# Patient Record
Sex: MALE | Race: WHITE | Employment: OTHER | URBAN - NONMETROPOLITAN AREA
[De-identification: names, ages, dates, MRNs, and addresses within clinical notes are randomized per-mention and may not be internally consistent; named-entity substitution may affect disease eponyms.]

---

## 2017-01-31 RX ORDER — FLUDROCORTISONE ACETATE 0.1 MG/1
0.1 TABLET ORAL DAILY
Qty: 30 TABLET | Refills: 6 | Status: SHIPPED | OUTPATIENT
Start: 2017-01-31 | End: 2017-09-13 | Stop reason: SDUPTHER

## 2017-04-20 ENCOUNTER — OFFICE VISIT (OUTPATIENT)
Dept: CARDIOLOGY CLINIC | Age: 72
End: 2017-04-20
Payer: MEDICARE

## 2017-04-20 VITALS
HEIGHT: 72 IN | BODY MASS INDEX: 24.52 KG/M2 | HEART RATE: 95 BPM | WEIGHT: 181 LBS | DIASTOLIC BLOOD PRESSURE: 100 MMHG | SYSTOLIC BLOOD PRESSURE: 154 MMHG | RESPIRATION RATE: 16 BRPM

## 2017-04-20 DIAGNOSIS — R55 VASODEPRESSOR SYNCOPE: Primary | ICD-10-CM

## 2017-04-20 PROCEDURE — G8420 CALC BMI NORM PARAMETERS: HCPCS | Performed by: INTERNAL MEDICINE

## 2017-04-20 PROCEDURE — 3017F COLORECTAL CA SCREEN DOC REV: CPT | Performed by: INTERNAL MEDICINE

## 2017-04-20 PROCEDURE — 4040F PNEUMOC VAC/ADMIN/RCVD: CPT | Performed by: INTERNAL MEDICINE

## 2017-04-20 PROCEDURE — 1123F ACP DISCUSS/DSCN MKR DOCD: CPT | Performed by: INTERNAL MEDICINE

## 2017-04-20 PROCEDURE — 1036F TOBACCO NON-USER: CPT | Performed by: INTERNAL MEDICINE

## 2017-04-20 PROCEDURE — G8427 DOCREV CUR MEDS BY ELIG CLIN: HCPCS | Performed by: INTERNAL MEDICINE

## 2017-04-20 PROCEDURE — 99213 OFFICE O/P EST LOW 20 MIN: CPT | Performed by: INTERNAL MEDICINE

## 2017-06-21 PROBLEM — I10 ESSENTIAL HYPERTENSION: Status: ACTIVE | Noted: 2017-06-21

## 2017-09-14 RX ORDER — FLUDROCORTISONE ACETATE 0.1 MG/1
0.1 TABLET ORAL DAILY
Qty: 90 TABLET | Refills: 3 | Status: SHIPPED | OUTPATIENT
Start: 2017-09-14 | End: 2018-08-14 | Stop reason: SDUPTHER

## 2017-10-23 ENCOUNTER — OFFICE VISIT (OUTPATIENT)
Dept: CARDIOLOGY CLINIC | Age: 72
End: 2017-10-23
Payer: MEDICARE

## 2017-10-23 VITALS
DIASTOLIC BLOOD PRESSURE: 96 MMHG | HEART RATE: 92 BPM | SYSTOLIC BLOOD PRESSURE: 156 MMHG | HEIGHT: 72 IN | BODY MASS INDEX: 24.24 KG/M2 | WEIGHT: 179 LBS

## 2017-10-23 DIAGNOSIS — I10 HYPERTENSION, UNSPECIFIED TYPE: Primary | ICD-10-CM

## 2017-10-23 PROCEDURE — G8420 CALC BMI NORM PARAMETERS: HCPCS | Performed by: INTERNAL MEDICINE

## 2017-10-23 PROCEDURE — G8484 FLU IMMUNIZE NO ADMIN: HCPCS | Performed by: INTERNAL MEDICINE

## 2017-10-23 PROCEDURE — 1123F ACP DISCUSS/DSCN MKR DOCD: CPT | Performed by: INTERNAL MEDICINE

## 2017-10-23 PROCEDURE — 3017F COLORECTAL CA SCREEN DOC REV: CPT | Performed by: INTERNAL MEDICINE

## 2017-10-23 PROCEDURE — G8427 DOCREV CUR MEDS BY ELIG CLIN: HCPCS | Performed by: INTERNAL MEDICINE

## 2017-10-23 PROCEDURE — 4040F PNEUMOC VAC/ADMIN/RCVD: CPT | Performed by: INTERNAL MEDICINE

## 2017-10-23 PROCEDURE — 1036F TOBACCO NON-USER: CPT | Performed by: INTERNAL MEDICINE

## 2017-10-23 PROCEDURE — 99213 OFFICE O/P EST LOW 20 MIN: CPT | Performed by: INTERNAL MEDICINE

## 2017-10-23 RX ORDER — VALSARTAN 40 MG/1
40 TABLET ORAL DAILY
Qty: 30 TABLET | Refills: 3 | Status: SHIPPED | OUTPATIENT
Start: 2017-10-23 | End: 2018-01-09 | Stop reason: SDUPTHER

## 2017-10-23 NOTE — PROGRESS NOTES
Today's Date: 10/23/2017  Patient Name: Amaris Diamond  Patient's age: 67 y.o., 1945          The patient is a 67 y.o.  male is in the office for f/u, had few dizzy spells but no syncope. Denies angina. Past Medical History:   has a past medical history of History of PSVT (paroxysmal supraventricular tachycardia); Low back pain; UTI (urinary tract infection); Uvulitis; Vasodepressor syncope; and Wound, open, face. Past Surgical History:   has a past surgical history that includes Colonoscopy. Home Medications:    Prior to Admission medications    Medication Sig Start Date End Date Taking? Authorizing Provider   HYDROcodone-acetaminophen (NORCO)  MG per tablet Take 1 tablet by mouth every 6 hours as needed for Pain . 10/18/17   Zachary Escobar MD   fludrocortisone (FLORINEF) 0.1 MG tablet Take 1 tablet by mouth daily 9/14/17   Charly Figueroa MD   meloxicam (MOBIC) 15 MG tablet TAKE ONE TABLET BY MOUTH ONCE DAILY 9/1/17   Zachary Escobar MD   allopurinol (ZYLOPRIM) 300 MG tablet TAKE ONE TABLET BY MOUTH ONCE DAILY 6/19/17   Zachary Escobar MD   sildenafil (VIAGRA) 100 MG tablet Take 1 tablet by mouth as needed for Erectile Dysfunction 1/17/17   Zachary Escobar MD   pseudoephedrine (SUDAFED 12 HOUR) 120 MG extended release tablet Take 1 tablet by mouth every 12 hours 1/17/17   Zachary Escobar MD   Docusate Calcium (STOOL SOFTENER PO) Take 1 capsule by mouth daily     Historical Provider, MD   aspirin 81 MG tablet Take 81 mg by mouth daily    Historical Provider, MD       Allergies:  Review of patient's allergies indicates no known allergies. Social History:   reports that he has quit smoking. He has never used smokeless tobacco. He reports that he does not drink alcohol or use drugs. REVIEW OF SYSTEMS:  CONSTITUTIONAL:NEGATIVE  HEENT:NEG  Cardiovascular: No chest pain, No dyspnea on exertion, No palpitations.  Lower extremity edema: No  RESPIRATORY: neg  GASTROINTESTINAL:  negative  GENITOURINARY:  negative  INTEGUMENT:  negative  MUSCULOSKELETAL:  positive for  pain  NEUROLOGICAL:  dizziness    PHYSICAL EXAM:      BP (!) 156/96   Pulse 92   Ht 5' 11.75\" (1.822 m)   Wt 179 lb (81.2 kg)   BMI 24.45 kg/m²    HEENT: PERRL, no cervical lymphadenopathy. No masses palpable. Cardiovascular:  · The apical impulse is not displaced  · Heart  Sounds: RRR, NO S3/RUB/DM  · Jugular venous pulsation Normal  · The carotid upstroke is NL  · Peripheral pulses are symmetrical and full  Respiratory: Good respiratory effort. On auscultation: clear to auscultation bilaterally  Abdomen:  · No masses or tenderness  · Bowel sounds present  Extremities:  ·  No Cyanosis or Clubbing  ·  Lower extremity edema: No  Skin: Warm and dry    Cardiac data:    EKG: NSR, SEPTAL Q WAVES    Labs:     CBC: No results for input(s): WBC, HGB, HCT, PLT in the last 72 hours. BMP: No results for input(s): NA, K, CO2, BUN, CREATININE, LABGLOM, GLUCOSE in the last 72 hours. PT/INR: No results for input(s): PROTIME, INR in the last 72 hours. FASTING LIPID PANEL:  Lab Results   Component Value Date    HDL 44 08/05/2016    LDLCALC 46 08/05/2016    TRIG 43 08/05/2016     LIVER PROFILE:No results for input(s): AST, ALT, LABALBU in the last 72 hours.     IMPRESSION:    Vasodepressor syncope (+ tilt table test 7/2014)  Negative stress test  PRESERVED LV SYSTOLIC FUCNTION   HTN  PSVT, REMOTE HX  Patient Active Problem List   Diagnosis    Allergic rhinitis    Arterial insufficiency (HCC)    Autonomic nervous system disorder    Bursitis of right hip    Diverticulosis    EKG, abnormal    Encounter for screening for malignant neoplasm of prostate    Fatty liver    Gout    Lower back pain    Male erectile disorder    Muscle weakness    Polyarthritis    Raynaud's disease    Anxiety    Vasodepressor syncope    History of PSVT (paroxysmal supraventricular tachycardia)    Gastroesophageal reflux disease without esophagitis    Essential hypertension       RECOMMENDATIONS:  START DIOVAN 40 MG   REGULAR EXERCISE  CALORIE RESTRICTION  WEIGHT LOSS  CONTINUE CURRENT TREATMENT    F/U 3 MONTHS          Lindy Leon [address] Cardiology Consult           [phone]

## 2018-01-22 ENCOUNTER — OFFICE VISIT (OUTPATIENT)
Dept: CARDIOLOGY CLINIC | Age: 73
End: 2018-01-22
Payer: MEDICARE

## 2018-01-22 VITALS
BODY MASS INDEX: 25.6 KG/M2 | HEIGHT: 72 IN | WEIGHT: 189 LBS | HEART RATE: 80 BPM | DIASTOLIC BLOOD PRESSURE: 96 MMHG | SYSTOLIC BLOOD PRESSURE: 158 MMHG

## 2018-01-22 DIAGNOSIS — I10 HYPERTENSION, UNSPECIFIED TYPE: Primary | ICD-10-CM

## 2018-01-22 PROCEDURE — 99213 OFFICE O/P EST LOW 20 MIN: CPT | Performed by: INTERNAL MEDICINE

## 2018-01-22 NOTE — PROGRESS NOTES
Today's Date: 1/22/2018  Patient Name: Rosie Tsai  Patient's age: 67 y.o., 1945          The patient is a 67 y.o.  male is in the office for f/u, his BP was elevated requiring medications dose increase. Past Medical History:   has a past medical history of History of PSVT (paroxysmal supraventricular tachycardia); Low back pain; UTI (urinary tract infection); Uvulitis; Vasodepressor syncope; and Wound, open, face. Past Surgical History:   has a past surgical history that includes Colonoscopy. Home Medications:    Prior to Admission medications    Medication Sig Start Date End Date Taking? Authorizing Provider   HYDROcodone-acetaminophen (NORCO)  MG per tablet Take 1 tablet by mouth every 6 hours as needed for Pain for up to 30 days. 1/17/18 2/16/18 Yes Franc Steele MD   valsartan (DIOVAN) 80 MG tablet Take 1 tablet by mouth daily 1/9/18  Yes Franc Steele MD   allopurinol (ZYLOPRIM) 300 MG tablet TAKE ONE TABLET BY MOUTH ONCE DAILY 12/18/17  Yes Franc Steele MD   fludrocortisone (FLORINEF) 0.1 MG tablet Take 1 tablet by mouth daily 9/14/17  Yes Jia Pérez MD   meloxicam (MOBIC) 15 MG tablet TAKE ONE TABLET BY MOUTH ONCE DAILY 9/1/17  Yes Franc Steele MD   Docusate Calcium (STOOL SOFTENER PO) Take 1 capsule by mouth daily    Yes Historical Provider, MD   aspirin 81 MG tablet Take 81 mg by mouth daily   Yes Historical Provider, MD   hydrALAZINE (APRESOLINE) 10 MG tablet Take 1 tablet by mouth 4 times daily as needed (systolic greater than 294) 1/9/18 1/9/19  Franc Steele MD   sildenafil (VIAGRA) 100 MG tablet Take 1 tablet by mouth as needed for Erectile Dysfunction 1/17/17   Franc Steele MD       Allergies:  Review of patient's allergies indicates no known allergies. Social History:   reports that he quit smoking about 42 years ago.  He has never used smokeless tobacco. He reports that he does not drink alcohol or use drugs. REVIEW OF SYSTEMS:  CONSTITUTIONAL:NEGATIVE  HEENT:NEG  Cardiovascular: No chest pain, No dyspnea on exertion, No palpitations. Lower extremity edema: No  RESPIRATORY: neg  GASTROINTESTINAL:  negative  GENITOURINARY:  negative  INTEGUMENT:  negative  MUSCULOSKELETAL:  positive for  pain  NEUROLOGICAL:  negative    PHYSICAL EXAM:      BP (!) 158/96   Pulse 80   Ht 6' (1.829 m)   Wt 189 lb (85.7 kg)   BMI 25.63 kg/m²    HEENT: PERRL, no cervical lymphadenopathy. No masses palpable. Cardiovascular:  · The apical impulse is not displaced  · Heart  Sounds:RRR, S4  · Jugular venous pulsation Normal  · The carotid upstroke is NL  · Peripheral pulses are symmetrical and full  Respiratory: Good respiratory effort. On auscultation: clear to auscultation bilaterally  Abdomen:  · No masses or tenderness  · Bowel sounds present  Extremities:  ·  No Cyanosis or Clubbing  ·  Lower extremity edema: No  Skin: Warm and dry    Cardiac data:      Labs:     CBC: No results for input(s): WBC, HGB, HCT, PLT in the last 72 hours. BMP: No results for input(s): NA, K, CO2, BUN, CREATININE, LABGLOM, GLUCOSE in the last 72 hours. PT/INR: No results for input(s): PROTIME, INR in the last 72 hours. FASTING LIPID PANEL:  Lab Results   Component Value Date    HDL 41 10/25/2017    LDLCALC 60 10/25/2017    TRIG 58 10/25/2017     LIVER PROFILE:No results for input(s): AST, ALT, LABALBU in the last 72 hours.     IMPRESSION:    Vasodepressor syncope (+ tilt table test 7/2014)  Negative stress test  PRESERVED LV SYSTOLIC FUCNTION   HTN  PSVT, REMOTE HX  WEIGHT GAIN  Patient Active Problem List   Diagnosis    Allergic rhinitis    Arterial insufficiency (HCC)    Autonomic nervous system disorder    Bursitis of right hip    Diverticulosis    EKG, abnormal    Encounter for screening for malignant neoplasm of prostate    Fatty liver    Gout    Lower back pain    Male erectile disorder    Muscle weakness   

## 2018-05-01 PROBLEM — M16.11 PRIMARY OSTEOARTHRITIS OF RIGHT HIP: Status: ACTIVE | Noted: 2018-05-01

## 2018-07-16 ENCOUNTER — OFFICE VISIT (OUTPATIENT)
Dept: CARDIOLOGY CLINIC | Age: 73
End: 2018-07-16
Payer: MEDICARE

## 2018-07-16 VITALS
BODY MASS INDEX: 25.57 KG/M2 | WEIGHT: 188.8 LBS | DIASTOLIC BLOOD PRESSURE: 84 MMHG | RESPIRATION RATE: 18 BRPM | HEIGHT: 72 IN | SYSTOLIC BLOOD PRESSURE: 144 MMHG

## 2018-07-16 DIAGNOSIS — R55 VASODEPRESSOR SYNCOPE: ICD-10-CM

## 2018-07-16 DIAGNOSIS — I77.1 ARTERIAL INSUFFICIENCY (HCC): Primary | ICD-10-CM

## 2018-07-16 DIAGNOSIS — I10 ESSENTIAL HYPERTENSION: ICD-10-CM

## 2018-07-16 DIAGNOSIS — I73.00 RAYNAUD'S DISEASE WITHOUT GANGRENE: ICD-10-CM

## 2018-07-16 DIAGNOSIS — G90.9 AUTONOMIC NERVOUS SYSTEM DISORDER: ICD-10-CM

## 2018-07-16 PROCEDURE — 99213 OFFICE O/P EST LOW 20 MIN: CPT | Performed by: INTERNAL MEDICINE

## 2018-07-16 NOTE — PROGRESS NOTES
Today's Date: 7/16/2018  Patient Name: Valerie Wright  Patient's age: 75 y.o., 1945          The patient is a 75 y.o.  male is in the office for f/u  Doing well. No chest pain, no dyspnea. No pre-syncope or syncope. Very good functional capacity. Past Medical History:   has a past medical history of History of PSVT (paroxysmal supraventricular tachycardia); Low back pain; UTI (urinary tract infection); Uvulitis; Vasodepressor syncope; and Wound, open, face. Past Surgical History:   has a past surgical history that includes Colonoscopy. Home Medications:    Prior to Admission medications    Medication Sig Start Date End Date Taking? Authorizing Provider   allopurinol (ZYLOPRIM) 300 MG tablet TAKE ONE TABLET BY MOUTH ONCE DAILY 6/6/18  Yes Rakan Medeiros MD   meloxicam [facility] DELA CRU[facility]) 15 MG tablet TAKE ONE TABLET BY MOUTH ONCE DAILY 6/4/18  Yes Rakan Medeiros MD   valsartan (DIOVAN) 160 MG tablet Take 1 tablet by mouth daily 5/1/18  Yes Rakan Medeiros MD   metoprolol tartrate (LOPRESSOR) 25 MG tablet Take 1 tablet by mouth 2 times daily 1/23/18  Yes Rakan Medeiros MD   hydrALAZINE (APRESOLINE) 10 MG tablet Take 1 tablet by mouth 4 times daily as needed (systolic greater than 290) 1/9/18 1/9/19 Yes Rakan Medeiros MD   fludrocortisone (FLORINEF) 0.1 MG tablet Take 1 tablet by mouth daily 9/14/17  Yes Jorge Turpin MD   Docusate Calcium (STOOL SOFTENER PO) Take 1 capsule by mouth daily    Yes Historical Provider, MD   aspirin 81 MG tablet Take 81 mg by mouth daily   Yes Historical Provider, MD   azelastine (ASTELIN) 0.1 % nasal spray 1 spray by Nasal route 2 times daily Use in each nostril as directed 5/1/18   Rakan Medeiros MD   sildenafil (VIAGRA) 100 MG tablet Take 1 tablet by mouth as needed for Erectile Dysfunction 1/17/17   Rakan Medeiros MD       Allergies:  Patient has no known allergies.     Social History:   reports that he quit smoking about 42 years ago. He has never used smokeless tobacco. He reports that he does not drink alcohol or use drugs. REVIEW OF SYSTEMS:  CONSTITUTIONAL:NEGATIVE  HEENT:NEG  Cardiovascular: No chest pain, No dyspnea on exertion, No palpitations. Lower extremity edema: No  RESPIRATORY: neg  GASTROINTESTINAL:  negative  GENITOURINARY:  negative  INTEGUMENT:  negative  MUSCULOSKELETAL:  positive for  pain  NEUROLOGICAL:  negative    PHYSICAL EXAM:      BP (!) 144/84 (Site: Right Arm, Position: Sitting, Cuff Size: Large Adult)   Resp 18   Ht 6' (1.829 m)   Wt 188 lb 12.8 oz (85.6 kg)   BMI 25.61 kg/m²    HEENT: PERRL, no cervical lymphadenopathy. No masses palpable. Cardiovascular:  · The apical impulse is not displaced  · Heart  Sounds:RRR, S4  · Jugular venous pulsation Normal  · The carotid upstroke is NL  · Peripheral pulses are symmetrical and full  Respiratory: Good respiratory effort. On auscultation: clear to auscultation bilaterally  Abdomen:  · No masses or tenderness  · Bowel sounds present  Extremities:  ·  No Cyanosis or Clubbing  ·  Lower extremity edema: No  Skin: Warm and dry    Cardiac data:      Labs:     CBC: No results for input(s): WBC, HGB, HCT, PLT in the last 72 hours. BMP: No results for input(s): NA, K, CO2, BUN, CREATININE, LABGLOM, GLUCOSE in the last 72 hours. PT/INR: No results for input(s): PROTIME, INR in the last 72 hours. FASTING LIPID PANEL:  Lab Results   Component Value Date    HDL 41 10/25/2017    LDLCALC 60 10/25/2017    TRIG 58 10/25/2017     LIVER PROFILE:No results for input(s): AST, ALT, LABALBU in the last 72 hours.       Patient Active Problem List   Diagnosis    Allergic rhinitis    Arterial insufficiency (HCC)    Autonomic nervous system disorder    Bursitis of right hip    Diverticulosis    EKG, abnormal    Encounter for screening for malignant neoplasm of prostate    Fatty liver    Gout    Lower back pain    Male erectile disorder    Muscle

## 2018-10-05 RX ORDER — FLUDROCORTISONE ACETATE 0.1 MG/1
0.1 TABLET ORAL DAILY
Qty: 90 TABLET | Refills: 3 | Status: SHIPPED | OUTPATIENT
Start: 2018-10-05 | End: 2019-04-04

## 2018-12-13 ENCOUNTER — OFFICE VISIT (OUTPATIENT)
Dept: PRIMARY CARE CLINIC | Age: 73
End: 2018-12-13
Payer: MEDICARE

## 2018-12-13 VITALS
DIASTOLIC BLOOD PRESSURE: 76 MMHG | HEART RATE: 77 BPM | HEIGHT: 72 IN | SYSTOLIC BLOOD PRESSURE: 132 MMHG | WEIGHT: 189.6 LBS | BODY MASS INDEX: 25.68 KG/M2 | OXYGEN SATURATION: 97 %

## 2018-12-13 DIAGNOSIS — G89.29 CHRONIC LOW BACK PAIN WITHOUT SCIATICA, UNSPECIFIED BACK PAIN LATERALITY: ICD-10-CM

## 2018-12-13 DIAGNOSIS — R55 VASODEPRESSOR SYNCOPE: ICD-10-CM

## 2018-12-13 DIAGNOSIS — M54.50 CHRONIC LOW BACK PAIN WITHOUT SCIATICA, UNSPECIFIED BACK PAIN LATERALITY: ICD-10-CM

## 2018-12-13 DIAGNOSIS — M70.71 BURSITIS OF RIGHT HIP, UNSPECIFIED BURSA: ICD-10-CM

## 2018-12-13 DIAGNOSIS — M16.11 PRIMARY OSTEOARTHRITIS OF RIGHT HIP: Primary | ICD-10-CM

## 2018-12-13 DIAGNOSIS — I10 ESSENTIAL HYPERTENSION: ICD-10-CM

## 2018-12-13 PROCEDURE — 1123F ACP DISCUSS/DSCN MKR DOCD: CPT | Performed by: FAMILY MEDICINE

## 2018-12-13 PROCEDURE — 1101F PT FALLS ASSESS-DOCD LE1/YR: CPT | Performed by: FAMILY MEDICINE

## 2018-12-13 PROCEDURE — 4040F PNEUMOC VAC/ADMIN/RCVD: CPT | Performed by: FAMILY MEDICINE

## 2018-12-13 PROCEDURE — 3017F COLORECTAL CA SCREEN DOC REV: CPT | Performed by: FAMILY MEDICINE

## 2018-12-13 PROCEDURE — G8419 CALC BMI OUT NRM PARAM NOF/U: HCPCS | Performed by: FAMILY MEDICINE

## 2018-12-13 PROCEDURE — 99214 OFFICE O/P EST MOD 30 MIN: CPT | Performed by: FAMILY MEDICINE

## 2018-12-13 PROCEDURE — 1036F TOBACCO NON-USER: CPT | Performed by: FAMILY MEDICINE

## 2018-12-13 PROCEDURE — G8427 DOCREV CUR MEDS BY ELIG CLIN: HCPCS | Performed by: FAMILY MEDICINE

## 2018-12-13 PROCEDURE — G8482 FLU IMMUNIZE ORDER/ADMIN: HCPCS | Performed by: FAMILY MEDICINE

## 2018-12-13 RX ORDER — HYDROCODONE BITARTRATE AND ACETAMINOPHEN 10; 325 MG/1; MG/1
1 TABLET ORAL EVERY 6 HOURS PRN
Qty: 120 TABLET | Refills: 0 | Status: SHIPPED | OUTPATIENT
Start: 2018-12-13 | End: 2019-01-16 | Stop reason: SDUPTHER

## 2018-12-13 RX ORDER — MELOXICAM 15 MG/1
15 TABLET ORAL DAILY
COMMUNITY
End: 2019-04-04

## 2018-12-13 RX ORDER — ALLOPURINOL 300 MG/1
TABLET ORAL
Qty: 90 TABLET | Refills: 1 | Status: SHIPPED | OUTPATIENT
Start: 2018-12-13 | End: 2019-06-13 | Stop reason: SDUPTHER

## 2018-12-13 ASSESSMENT — ENCOUNTER SYMPTOMS
DIARRHEA: 0
NAUSEA: 0
EYE DISCHARGE: 0
EYE REDNESS: 0
RHINORRHEA: 0
WHEEZING: 0
VOMITING: 0
SHORTNESS OF BREATH: 0
COUGH: 0
ABDOMINAL PAIN: 0
SORE THROAT: 0

## 2018-12-13 ASSESSMENT — PATIENT HEALTH QUESTIONNAIRE - PHQ9
SUM OF ALL RESPONSES TO PHQ9 QUESTIONS 1 & 2: 0
2. FEELING DOWN, DEPRESSED OR HOPELESS: 0
SUM OF ALL RESPONSES TO PHQ QUESTIONS 1-9: 0
SUM OF ALL RESPONSES TO PHQ QUESTIONS 1-9: 0
1. LITTLE INTEREST OR PLEASURE IN DOING THINGS: 0

## 2018-12-13 NOTE — PROGRESS NOTES
Genitourinary: Negative for dysuria and frequency. Musculoskeletal: Negative for arthralgias and myalgias. Neurological: Negative for dizziness, light-headedness and headaches. Psychiatric/Behavioral: Negative for sleep disturbance. Objective:     /76   Pulse 77   Ht 6' (1.829 m)   Wt 189 lb 9.6 oz (86 kg)   SpO2 97%   BMI 25.71 kg/m²   Physical Exam   Constitutional: He is oriented to person, place, and time. He appears well-developed and well-nourished. No distress. HENT:   Head: Normocephalic and atraumatic. Mouth/Throat: Oropharynx is clear and moist.   Eyes: Pupils are equal, round, and reactive to light. Conjunctivae are normal. Right eye exhibits no discharge. Left eye exhibits no discharge. No scleral icterus. Neck: No tracheal deviation present. No thyromegaly present. Cardiovascular: Normal rate, regular rhythm and normal heart sounds. No carotid bruits   Pulmonary/Chest: Effort normal and breath sounds normal. No respiratory distress. He has no wheezes. Musculoskeletal: He exhibits no edema. Lymphadenopathy:     He has no cervical adenopathy. Neurological: He is alert and oriented to person, place, and time. Skin: Skin is warm. No rash noted. Psychiatric: He has a normal mood and affect. His behavior is normal. Thought content normal.   Nursing note and vitals reviewed. Controlled Substances Monitoring:     RX Monitoring 12/13/2018   Attestation The Prescription Monitoring Report for this patient was reviewed today. Documentation Possible medication side effects, risk of tolerance/dependence & alternative treatments discussed. ;No signs of potential drug abuse or diversion identified. Acute Pain Prescriptions -   Chronic Pain Treatment objectives documented - patient is progressing appropriately. Medication Contracts Existing medication contract. Assessment:       Diagnosis Orders   1. Primary osteoarthritis of right hip     2.  Chronic low back

## 2019-01-03 ENCOUNTER — OFFICE VISIT (OUTPATIENT)
Dept: CARDIOLOGY CLINIC | Age: 74
End: 2019-01-03
Payer: MEDICARE

## 2019-01-03 VITALS
DIASTOLIC BLOOD PRESSURE: 80 MMHG | BODY MASS INDEX: 25.55 KG/M2 | HEART RATE: 70 BPM | WEIGHT: 188.6 LBS | SYSTOLIC BLOOD PRESSURE: 159 MMHG | HEIGHT: 72 IN

## 2019-01-03 DIAGNOSIS — I10 ESSENTIAL HYPERTENSION: ICD-10-CM

## 2019-01-03 DIAGNOSIS — I77.1 ARTERIAL INSUFFICIENCY (HCC): Primary | ICD-10-CM

## 2019-01-03 DIAGNOSIS — J30.9 ALLERGIC RHINITIS, UNSPECIFIED SEASONALITY, UNSPECIFIED TRIGGER: ICD-10-CM

## 2019-01-03 DIAGNOSIS — R55 VASODEPRESSOR SYNCOPE: ICD-10-CM

## 2019-01-03 DIAGNOSIS — Z86.79 HISTORY OF PSVT (PAROXYSMAL SUPRAVENTRICULAR TACHYCARDIA): ICD-10-CM

## 2019-01-03 DIAGNOSIS — F41.9 ANXIETY: ICD-10-CM

## 2019-01-03 PROCEDURE — 4040F PNEUMOC VAC/ADMIN/RCVD: CPT | Performed by: INTERNAL MEDICINE

## 2019-01-03 PROCEDURE — G8419 CALC BMI OUT NRM PARAM NOF/U: HCPCS | Performed by: INTERNAL MEDICINE

## 2019-01-03 PROCEDURE — 1036F TOBACCO NON-USER: CPT | Performed by: INTERNAL MEDICINE

## 2019-01-03 PROCEDURE — G8427 DOCREV CUR MEDS BY ELIG CLIN: HCPCS | Performed by: INTERNAL MEDICINE

## 2019-01-03 PROCEDURE — 1123F ACP DISCUSS/DSCN MKR DOCD: CPT | Performed by: INTERNAL MEDICINE

## 2019-01-03 PROCEDURE — 99213 OFFICE O/P EST LOW 20 MIN: CPT | Performed by: INTERNAL MEDICINE

## 2019-01-03 PROCEDURE — G8482 FLU IMMUNIZE ORDER/ADMIN: HCPCS | Performed by: INTERNAL MEDICINE

## 2019-01-03 PROCEDURE — 3017F COLORECTAL CA SCREEN DOC REV: CPT | Performed by: INTERNAL MEDICINE

## 2019-01-03 PROCEDURE — 1101F PT FALLS ASSESS-DOCD LE1/YR: CPT | Performed by: INTERNAL MEDICINE

## 2019-01-16 DIAGNOSIS — M54.50 CHRONIC LOW BACK PAIN WITHOUT SCIATICA, UNSPECIFIED BACK PAIN LATERALITY: ICD-10-CM

## 2019-01-16 DIAGNOSIS — M70.71 BURSITIS OF RIGHT HIP, UNSPECIFIED BURSA: ICD-10-CM

## 2019-01-16 DIAGNOSIS — G89.29 CHRONIC LOW BACK PAIN WITHOUT SCIATICA, UNSPECIFIED BACK PAIN LATERALITY: ICD-10-CM

## 2019-01-16 RX ORDER — HYDROCODONE BITARTRATE AND ACETAMINOPHEN 10; 325 MG/1; MG/1
1 TABLET ORAL EVERY 6 HOURS PRN
Qty: 120 TABLET | Refills: 0 | Status: SHIPPED | OUTPATIENT
Start: 2019-01-16 | End: 2019-02-20 | Stop reason: SDUPTHER

## 2019-02-20 DIAGNOSIS — M70.71 BURSITIS OF RIGHT HIP, UNSPECIFIED BURSA: ICD-10-CM

## 2019-02-20 DIAGNOSIS — M54.50 CHRONIC LOW BACK PAIN WITHOUT SCIATICA, UNSPECIFIED BACK PAIN LATERALITY: ICD-10-CM

## 2019-02-20 DIAGNOSIS — G89.29 CHRONIC LOW BACK PAIN WITHOUT SCIATICA, UNSPECIFIED BACK PAIN LATERALITY: ICD-10-CM

## 2019-02-20 RX ORDER — HYDROCODONE BITARTRATE AND ACETAMINOPHEN 10; 325 MG/1; MG/1
1 TABLET ORAL EVERY 6 HOURS PRN
Qty: 120 TABLET | Refills: 0 | Status: SHIPPED | OUTPATIENT
Start: 2019-02-20 | End: 2019-03-14 | Stop reason: SDUPTHER

## 2019-03-14 DIAGNOSIS — G89.29 CHRONIC LOW BACK PAIN WITHOUT SCIATICA, UNSPECIFIED BACK PAIN LATERALITY: ICD-10-CM

## 2019-03-14 DIAGNOSIS — M70.71 BURSITIS OF RIGHT HIP, UNSPECIFIED BURSA: ICD-10-CM

## 2019-03-14 DIAGNOSIS — M54.50 CHRONIC LOW BACK PAIN WITHOUT SCIATICA, UNSPECIFIED BACK PAIN LATERALITY: ICD-10-CM

## 2019-03-14 RX ORDER — HYDROCODONE BITARTRATE AND ACETAMINOPHEN 10; 325 MG/1; MG/1
1 TABLET ORAL EVERY 6 HOURS PRN
Qty: 120 TABLET | Refills: 0 | Status: SHIPPED | OUTPATIENT
Start: 2019-03-21 | End: 2019-04-04 | Stop reason: SDUPTHER

## 2019-03-29 DIAGNOSIS — G89.29 CHRONIC LOW BACK PAIN WITHOUT SCIATICA, UNSPECIFIED BACK PAIN LATERALITY: ICD-10-CM

## 2019-03-29 DIAGNOSIS — M70.71 BURSITIS OF RIGHT HIP, UNSPECIFIED BURSA: ICD-10-CM

## 2019-03-29 DIAGNOSIS — M54.50 CHRONIC LOW BACK PAIN WITHOUT SCIATICA, UNSPECIFIED BACK PAIN LATERALITY: ICD-10-CM

## 2019-03-29 RX ORDER — HYDROCODONE BITARTRATE AND ACETAMINOPHEN 10; 325 MG/1; MG/1
1 TABLET ORAL EVERY 6 HOURS PRN
Qty: 120 TABLET | Refills: 0 | Status: CANCELLED | OUTPATIENT
Start: 2019-03-29 | End: 2019-04-28

## 2019-04-04 ENCOUNTER — HOSPITAL ENCOUNTER (OUTPATIENT)
Age: 74
Setting detail: SPECIMEN
Discharge: HOME | End: 2019-04-04
Payer: MEDICARE

## 2019-04-04 ENCOUNTER — OFFICE VISIT (OUTPATIENT)
Dept: PRIMARY CARE CLINIC | Age: 74
End: 2019-04-04
Payer: MEDICARE

## 2019-04-04 VITALS
HEART RATE: 67 BPM | BODY MASS INDEX: 24.54 KG/M2 | HEIGHT: 72 IN | WEIGHT: 181.2 LBS | DIASTOLIC BLOOD PRESSURE: 86 MMHG | SYSTOLIC BLOOD PRESSURE: 136 MMHG | OXYGEN SATURATION: 98 %

## 2019-04-04 DIAGNOSIS — M70.71 BURSITIS OF RIGHT HIP, UNSPECIFIED BURSA: ICD-10-CM

## 2019-04-04 DIAGNOSIS — D64.9 ANEMIA, UNSPECIFIED TYPE: Primary | ICD-10-CM

## 2019-04-04 DIAGNOSIS — M54.50 CHRONIC LOW BACK PAIN WITHOUT SCIATICA, UNSPECIFIED BACK PAIN LATERALITY: ICD-10-CM

## 2019-04-04 DIAGNOSIS — Z23 NEED FOR VIRAL IMMUNIZATION: ICD-10-CM

## 2019-04-04 DIAGNOSIS — Z13.6 SCREENING FOR AAA (ABDOMINAL AORTIC ANEURYSM): ICD-10-CM

## 2019-04-04 DIAGNOSIS — D64.9 ANEMIA, UNSPECIFIED TYPE: ICD-10-CM

## 2019-04-04 DIAGNOSIS — G89.29 CHRONIC LOW BACK PAIN WITHOUT SCIATICA, UNSPECIFIED BACK PAIN LATERALITY: ICD-10-CM

## 2019-04-04 LAB
ABSOLUTE EOS #: 0.29 K/UL (ref 0–0.44)
ABSOLUTE IMMATURE GRANULOCYTE: <0.03 K/UL (ref 0–0.3)
ABSOLUTE LYMPH #: 1.66 K/UL (ref 1.1–3.7)
ABSOLUTE MONO #: 0.61 K/UL (ref 0.1–1.2)
ANION GAP SERPL CALCULATED.3IONS-SCNC: 9 MMOL/L (ref 9–17)
BASOPHILS # BLD: 1 % (ref 0–2)
BASOPHILS ABSOLUTE: 0.04 K/UL (ref 0–0.2)
BUN BLDV-MCNC: 15 MG/DL (ref 8–23)
BUN/CREAT BLD: NORMAL (ref 9–20)
CALCIUM SERPL-MCNC: 9.6 MG/DL (ref 8.6–10.4)
CHLORIDE BLD-SCNC: 102 MMOL/L (ref 98–107)
CO2: 28 MMOL/L (ref 20–31)
CREAT SERPL-MCNC: 0.95 MG/DL (ref 0.7–1.2)
DIFFERENTIAL TYPE: ABNORMAL
EOSINOPHILS RELATIVE PERCENT: 5 % (ref 1–4)
FERRITIN: 150 UG/L (ref 30–400)
GFR AFRICAN AMERICAN: >60 ML/MIN
GFR NON-AFRICAN AMERICAN: >60 ML/MIN
GFR SERPL CREATININE-BSD FRML MDRD: NORMAL ML/MIN/{1.73_M2}
GFR SERPL CREATININE-BSD FRML MDRD: NORMAL ML/MIN/{1.73_M2}
GLUCOSE BLD-MCNC: 78 MG/DL (ref 70–99)
HCT VFR BLD CALC: 40.9 % (ref 40.7–50.3)
HEMOGLOBIN: 13.3 G/DL (ref 13–17)
IMMATURE GRANULOCYTES: 0 %
IRON SATURATION: 28 % (ref 20–55)
IRON: 70 UG/DL (ref 59–158)
LYMPHOCYTES # BLD: 28 % (ref 24–43)
MCH RBC QN AUTO: 30.6 PG (ref 25.2–33.5)
MCHC RBC AUTO-ENTMCNC: 32.5 G/DL (ref 28.4–34.8)
MCV RBC AUTO: 94.2 FL (ref 82.6–102.9)
MONOCYTES # BLD: 10 % (ref 3–12)
NRBC AUTOMATED: 0 PER 100 WBC
PDW BLD-RTO: 13.8 % (ref 11.8–14.4)
PLATELET # BLD: 212 K/UL (ref 138–453)
PLATELET ESTIMATE: ABNORMAL
PMV BLD AUTO: 11.1 FL (ref 8.1–13.5)
POTASSIUM SERPL-SCNC: 5.1 MMOL/L (ref 3.7–5.3)
RBC # BLD: 4.34 M/UL (ref 4.21–5.77)
RBC # BLD: ABNORMAL 10*6/UL
SEG NEUTROPHILS: 56 % (ref 36–65)
SEGMENTED NEUTROPHILS ABSOLUTE COUNT: 3.24 K/UL (ref 1.5–8.1)
SODIUM BLD-SCNC: 139 MMOL/L (ref 135–144)
TOTAL IRON BINDING CAPACITY: 250 UG/DL (ref 250–450)
UNSATURATED IRON BINDING CAPACITY: 180 UG/DL (ref 112–347)
WBC # BLD: 5.9 K/UL (ref 3.5–11.3)
WBC # BLD: ABNORMAL 10*3/UL

## 2019-04-04 PROCEDURE — 1123F ACP DISCUSS/DSCN MKR DOCD: CPT | Performed by: FAMILY MEDICINE

## 2019-04-04 PROCEDURE — G8420 CALC BMI NORM PARAMETERS: HCPCS | Performed by: FAMILY MEDICINE

## 2019-04-04 PROCEDURE — 90732 PPSV23 VACC 2 YRS+ SUBQ/IM: CPT | Performed by: FAMILY MEDICINE

## 2019-04-04 PROCEDURE — 1036F TOBACCO NON-USER: CPT | Performed by: FAMILY MEDICINE

## 2019-04-04 PROCEDURE — 99213 OFFICE O/P EST LOW 20 MIN: CPT | Performed by: FAMILY MEDICINE

## 2019-04-04 PROCEDURE — G0009 ADMIN PNEUMOCOCCAL VACCINE: HCPCS | Performed by: FAMILY MEDICINE

## 2019-04-04 PROCEDURE — 3017F COLORECTAL CA SCREEN DOC REV: CPT | Performed by: FAMILY MEDICINE

## 2019-04-04 PROCEDURE — G8427 DOCREV CUR MEDS BY ELIG CLIN: HCPCS | Performed by: FAMILY MEDICINE

## 2019-04-04 PROCEDURE — 4040F PNEUMOC VAC/ADMIN/RCVD: CPT | Performed by: FAMILY MEDICINE

## 2019-04-04 RX ORDER — HYDROCODONE BITARTRATE AND ACETAMINOPHEN 10; 325 MG/1; MG/1
1 TABLET ORAL EVERY 6 HOURS PRN
Qty: 120 TABLET | Refills: 0 | Status: SHIPPED | OUTPATIENT
Start: 2019-04-04 | End: 2019-05-06 | Stop reason: SDUPTHER

## 2019-04-04 ASSESSMENT — ENCOUNTER SYMPTOMS
WHEEZING: 0
SORE THROAT: 0
RHINORRHEA: 0
VOMITING: 0
SHORTNESS OF BREATH: 0
NAUSEA: 0
EYE DISCHARGE: 0
DIARRHEA: 0
ABDOMINAL PAIN: 0
EYE REDNESS: 0
COUGH: 0

## 2019-04-04 ASSESSMENT — PATIENT HEALTH QUESTIONNAIRE - PHQ9
SUM OF ALL RESPONSES TO PHQ9 QUESTIONS 1 & 2: 0
SUM OF ALL RESPONSES TO PHQ QUESTIONS 1-9: 0
1. LITTLE INTEREST OR PLEASURE IN DOING THINGS: 0
2. FEELING DOWN, DEPRESSED OR HOPELESS: 0
SUM OF ALL RESPONSES TO PHQ QUESTIONS 1-9: 0

## 2019-04-04 NOTE — PROGRESS NOTES
Pain for up to 30 days. 120 tablet 0    Pseudoephedrine HCl (SUDAFED 12 HOUR PO) Take by mouth daily      metoprolol tartrate (LOPRESSOR) 25 MG tablet Take 1 tablet by mouth 2 times daily 180 tablet 3    losartan (COZAAR) 100 MG tablet Take 1 tablet by mouth daily 30 tablet 11    aspirin 81 MG tablet Take 81 mg by mouth daily      allopurinol (ZYLOPRIM) 300 MG tablet TAKE ONE TABLET BY MOUTH ONCE DAILY 90 tablet 1    hydrALAZINE (APRESOLINE) 10 MG tablet Take 1 tablet by mouth 4 times daily as needed (systolic greater than 468) 120 tablet 1    sildenafil (VIAGRA) 100 MG tablet Take 1 tablet by mouth as needed for Erectile Dysfunction 6 tablet 5     No current facility-administered medications for this visit. No Known Allergies    Health Maintenance   Topic Date Due    AAA screen  1945    Shingles Vaccine (1 of 2) 04/06/1994    Potassium monitoring  01/10/2019    Creatinine monitoring  01/10/2019    Lipid screen  10/25/2022    DTaP/Tdap/Td vaccine (2 - Td) 05/06/2025    Colon cancer screen colonoscopy  02/08/2026    Flu vaccine  Completed    Pneumococcal 65+ years Vaccine  Completed       Subjective:      Review of Systems   Constitutional: Negative for chills and fever. HENT: Negative for rhinorrhea and sore throat. Eyes: Negative for discharge and redness. Respiratory: Negative for cough, shortness of breath and wheezing. Cardiovascular: Negative for chest pain and palpitations. Gastrointestinal: Negative for abdominal pain, diarrhea, nausea and vomiting. Genitourinary: Negative for dysuria and frequency. Musculoskeletal: Negative for arthralgias and myalgias. Neurological: Positive for numbness (feet bilaterally occasionally). Negative for dizziness, light-headedness and headaches. Psychiatric/Behavioral: Negative for sleep disturbance.        Objective:     /86   Pulse 67   Ht 6' (1.829 m)   Wt 181 lb 3.2 oz (82.2 kg)   SpO2 98%   BMI 24.58 kg/m²   Physical Exam   Constitutional: He is oriented to person, place, and time. He appears well-developed and well-nourished. No distress. HENT:   Head: Normocephalic and atraumatic. Mouth/Throat: Oropharynx is clear and moist.   Eyes: Pupils are equal, round, and reactive to light. Conjunctivae are normal. Right eye exhibits no discharge. Left eye exhibits no discharge. No scleral icterus. Neck: No tracheal deviation present. No thyromegaly present. Cardiovascular: Normal rate, regular rhythm and normal heart sounds. No carotid bruits   Pulmonary/Chest: Effort normal and breath sounds normal. No respiratory distress. He has no wheezes. Musculoskeletal: He exhibits no edema. Right hip: He exhibits decreased strength and tenderness. Lymphadenopathy:     He has no cervical adenopathy. Neurological: He is alert and oriented to person, place, and time. Skin: Skin is warm. No rash noted. Psychiatric: He has a normal mood and affect. His behavior is normal. Thought content normal.   Nursing note and vitals reviewed. Assessment:       Diagnosis Orders   1. Anemia, unspecified type  CBC Auto Differential    Ferritin    Iron And TIBC    Basic Metabolic Panel   2. Screening for AAA (abdominal aortic aneurysm)  US SCREENING FOR AAA   3. Need for viral immunization  Pneumococcal polysaccharide vaccine 23-valent greater than or equal to 1yo subcutaneous/IM   4. Chronic low back pain without sciatica, unspecified back pain laterality  HYDROcodone-acetaminophen (NORCO)  MG per tablet   5. Bursitis of right hip, unspecified bursa  HYDROcodone-acetaminophen (NORCO)  MG per tablet        Plan:    Hold mobic  Labs ordered  May need GI referral for EGD if positive  Pt ed Shngrix  AAA screen    Return in about 3 months (around 7/4/2019).     Orders Placed This Encounter   Procedures    US SCREENING FOR AAA     Standing Status:   Future     Standing Expiration Date:   4/4/2020     Order Specific Question: Reason for exam:     Answer:   screen    Pneumococcal polysaccharide vaccine 23-valent greater than or equal to 3yo subcutaneous/IM    CBC Auto Differential     Standing Status:   Future     Standing Expiration Date:   4/4/2020    Ferritin     Standing Status:   Future     Standing Expiration Date:   4/4/2020    Iron And TIBC     Standing Status:   Future     Standing Expiration Date:   4/4/2020     Order Specific Question:   Is Patient Fasting? Answer:   na     Order Specific Question:   No of Hours? Answer:   na    Basic Metabolic Panel     Standing Status:   Future     Standing Expiration Date:   4/4/2020     Orders Placed This Encounter   Medications    HYDROcodone-acetaminophen (NORCO)  MG per tablet     Sig: Take 1 tablet by mouth every 6 hours as needed for Pain for up to 30 days. Dispense:  120 tablet     Refill:  0     Reduce doses taken as pain becomes manageable       Patient given educationalmaterials - see patient instructions. Discussed use, benefit, and side effectsof prescribed medications. All patient questions answered. Pt voiced understanding. Reviewed health maintenance. Instructed to continue current medications, diet andexercise. Patient agreed with treatment plan. Follow up as directed.      Electronicallysigned by Dennis Murrieta MD on 4/4/2019 at 12:48 PM

## 2019-04-12 DIAGNOSIS — Z13.6 SCREENING FOR AAA (ABDOMINAL AORTIC ANEURYSM): ICD-10-CM

## 2019-05-06 DIAGNOSIS — M70.71 BURSITIS OF RIGHT HIP, UNSPECIFIED BURSA: ICD-10-CM

## 2019-05-06 DIAGNOSIS — G89.29 CHRONIC LOW BACK PAIN WITHOUT SCIATICA, UNSPECIFIED BACK PAIN LATERALITY: ICD-10-CM

## 2019-05-06 DIAGNOSIS — M54.50 CHRONIC LOW BACK PAIN WITHOUT SCIATICA, UNSPECIFIED BACK PAIN LATERALITY: ICD-10-CM

## 2019-05-06 RX ORDER — HYDROCODONE BITARTRATE AND ACETAMINOPHEN 10; 325 MG/1; MG/1
1 TABLET ORAL EVERY 6 HOURS PRN
Qty: 120 TABLET | Refills: 0 | Status: SHIPPED | OUTPATIENT
Start: 2019-05-06 | End: 2019-06-13 | Stop reason: SDUPTHER

## 2019-05-07 ENCOUNTER — OFFICE VISIT (OUTPATIENT)
Dept: PRIMARY CARE CLINIC | Age: 74
End: 2019-05-07
Payer: MEDICARE

## 2019-05-07 VITALS
DIASTOLIC BLOOD PRESSURE: 78 MMHG | OXYGEN SATURATION: 98 % | HEIGHT: 72 IN | WEIGHT: 175.4 LBS | SYSTOLIC BLOOD PRESSURE: 132 MMHG | BODY MASS INDEX: 23.76 KG/M2 | HEART RATE: 80 BPM

## 2019-05-07 DIAGNOSIS — I10 ESSENTIAL HYPERTENSION: Primary | ICD-10-CM

## 2019-05-07 DIAGNOSIS — M16.11 PRIMARY OSTEOARTHRITIS OF RIGHT HIP: ICD-10-CM

## 2019-05-07 DIAGNOSIS — G89.29 CHRONIC LOW BACK PAIN WITHOUT SCIATICA, UNSPECIFIED BACK PAIN LATERALITY: ICD-10-CM

## 2019-05-07 DIAGNOSIS — M54.50 CHRONIC LOW BACK PAIN WITHOUT SCIATICA, UNSPECIFIED BACK PAIN LATERALITY: ICD-10-CM

## 2019-05-07 PROCEDURE — 1123F ACP DISCUSS/DSCN MKR DOCD: CPT | Performed by: FAMILY MEDICINE

## 2019-05-07 PROCEDURE — 1036F TOBACCO NON-USER: CPT | Performed by: FAMILY MEDICINE

## 2019-05-07 PROCEDURE — G8427 DOCREV CUR MEDS BY ELIG CLIN: HCPCS | Performed by: FAMILY MEDICINE

## 2019-05-07 PROCEDURE — 3017F COLORECTAL CA SCREEN DOC REV: CPT | Performed by: FAMILY MEDICINE

## 2019-05-07 PROCEDURE — 4040F PNEUMOC VAC/ADMIN/RCVD: CPT | Performed by: FAMILY MEDICINE

## 2019-05-07 PROCEDURE — G8420 CALC BMI NORM PARAMETERS: HCPCS | Performed by: FAMILY MEDICINE

## 2019-05-07 PROCEDURE — 99213 OFFICE O/P EST LOW 20 MIN: CPT | Performed by: FAMILY MEDICINE

## 2019-05-07 RX ORDER — MELOXICAM 15 MG/1
15 TABLET ORAL DAILY
Qty: 90 TABLET | Refills: 3
Start: 2019-05-07 | End: 2020-03-13 | Stop reason: SDUPTHER

## 2019-05-07 RX ORDER — SILDENAFIL 50 MG/1
50 TABLET, FILM COATED ORAL PRN
Qty: 6 TABLET | Refills: 5 | Status: SHIPPED | OUTPATIENT
Start: 2019-05-07 | End: 2020-02-06

## 2019-05-07 ASSESSMENT — ENCOUNTER SYMPTOMS
SORE THROAT: 0
DIARRHEA: 0
EYE DISCHARGE: 0
EYE REDNESS: 0
COUGH: 0
NAUSEA: 0
WHEEZING: 0
ABDOMINAL PAIN: 0
VOMITING: 0
RHINORRHEA: 0
SHORTNESS OF BREATH: 0

## 2019-05-07 NOTE — PROGRESS NOTES
[address] [place] [address] PRIMARY CARE  [id number] [address] [facility]  Dept: [phone]    Kimi López is a 75 y.o. male who presents today for his medical conditions/complaintsas noted below. Chief Complaint   Patient presents with    Discuss Labs    Other     Discuss Ultrasound       HPI:     HPI   Pt here to discuss lab results. No chest pain. Otherwise no melena or hematochezia. Labs reviewed. Pt had hip replacement. Still using norco for back pain. LDL Calculated (mg/dL)   Date Value   10/25/2017 60   2016 46       (goal LDL is <100)   BUN (mg/dL)   Date Value   2019 15     BP Readings from Last 3 Encounters:   19 132/78   19 136/86   19 (!) 159/80          (goal 120/80)    Past Medical History:   Diagnosis Date    History of PSVT (paroxysmal supraventricular tachycardia) 2016    Low back pain     UTI (urinary tract infection)     Uvulitis     Vasodepressor syncope 2016    Wound, open, face       Past Surgical History:   Procedure Laterality Date    COLONOSCOPY      HIP ARTHROPLASTY Right 2019       Family History   Problem Relation Age of Onset    Cancer Mother     Diabetes Mother     Alcohol Abuse Father        Social History     Tobacco Use    Smoking status: Former Smoker     Packs/day: 2.00     Years: 15.00     Pack years: 30.00     Types: Cigarettes     Last attempt to quit: 1976     Years since quittin.3    Smokeless tobacco: Never Used   Substance Use Topics    Alcohol use: No     Alcohol/week: 0.0 oz      Current Outpatient Medications   Medication Sig Dispense Refill    meloxicam (MOBIC) 15 MG tablet Take 1 tablet by mouth daily 90 tablet 3    HYDROcodone-acetaminophen (NORCO)  MG per tablet Take 1 tablet by mouth every 6 hours as needed for Pain for up to 30 days.  120 tablet 0    Pseudoephedrine HCl (SUDAFED 12 HOUR PO) Take by mouth daily      allopurinol (ZYLOPRIM) 300 MG tablet TAKE ONE TABLET BY MOUTH ONCE DAILY 90 tablet 1    metoprolol tartrate (LOPRESSOR) 25 MG tablet Take 1 tablet by mouth 2 times daily 180 tablet 3    losartan (COZAAR) 100 MG tablet Take 1 tablet by mouth daily 30 tablet 11    sildenafil (VIAGRA) 100 MG tablet Take 1 tablet by mouth as needed for Erectile Dysfunction 6 tablet 5    aspirin 81 MG tablet Take 81 mg by mouth daily      hydrALAZINE (APRESOLINE) 10 MG tablet Take 1 tablet by mouth 4 times daily as needed (systolic greater than 806) 120 tablet 1     No current facility-administered medications for this visit. No Known Allergies    Health Maintenance   Topic Date Due    Shingles Vaccine (1 of 2) 04/06/1994    Potassium monitoring  04/04/2020    Creatinine monitoring  04/04/2020    Lipid screen  10/25/2022    DTaP/Tdap/Td vaccine (2 - Td) 05/06/2025    Colon cancer screen colonoscopy  02/08/2026    Flu vaccine  Completed    Pneumococcal 65+ years Vaccine  Completed    AAA screen  Completed       Subjective:      Review of Systems   Constitutional: Negative for chills and fever. HENT: Negative for rhinorrhea and sore throat. Eyes: Negative for discharge and redness. Respiratory: Negative for cough, shortness of breath and wheezing. Cardiovascular: Negative for chest pain and palpitations. Gastrointestinal: Negative for abdominal pain, diarrhea, nausea and vomiting. Genitourinary: Negative for dysuria and frequency. Musculoskeletal: Negative for arthralgias and myalgias. Neurological: Negative for dizziness, light-headedness and headaches. Psychiatric/Behavioral: Negative for sleep disturbance. Objective:     /78   Pulse 80   Ht 6' (1.829 m)   Wt 175 lb 6.4 oz (79.6 kg)   SpO2 98%   BMI 23.79 kg/m²   Physical Exam   Constitutional: He is oriented to person, place, and time. He appears well-developed and well-nourished. No distress. HENT:   Head: Normocephalic and atraumatic.    Mouth/Throat: Oropharynx is clear and moist.   Eyes: Pupils are equal, round, and reactive to light. Conjunctivae are normal. Right eye exhibits no discharge. Left eye exhibits no discharge. No scleral icterus. Neck: No tracheal deviation present. No thyromegaly present. Cardiovascular: Normal rate, regular rhythm and normal heart sounds. No carotid bruits   Pulmonary/Chest: Effort normal and breath sounds normal. No respiratory distress. He has no wheezes. Musculoskeletal: He exhibits no edema. Lymphadenopathy:     He has no cervical adenopathy. Neurological: He is alert and oriented to person, place, and time. Skin: Skin is warm. No rash noted. Psychiatric: He has a normal mood and affect. His behavior is normal. Thought content normal.   Nursing note and vitals reviewed. Assessment:       Diagnosis Orders   1. Essential hypertension     2. Primary osteoarthritis of right hip     3. Chronic low back pain without sciatica, unspecified back pain laterality          Plan:    restart Mobic  Advise pt most likely had false test from [employer]    Return in about 3 months (around 8/7/2019). No orders of the defined types were placed in this encounter. Orders Placed This Encounter   Medications    meloxicam (MOBIC) 15 MG tablet     Sig: Take 1 tablet by mouth daily     Dispense:  90 tablet     Refill:  3       Patient given educationalmaterials - see patient instructions. Discussed use, benefit, and side effectsof prescribed medications. All patient questions answered. Pt voiced understanding. Reviewed health maintenance. Instructed to continue current medications, diet andexercise. Patient agreed with treatment plan. Follow up as directed.      Electronicallysigned by Suzie Cardozo MD on 5/7/2019 at 1:46 PM

## 2019-06-13 DIAGNOSIS — G89.29 CHRONIC LOW BACK PAIN WITHOUT SCIATICA, UNSPECIFIED BACK PAIN LATERALITY: ICD-10-CM

## 2019-06-13 DIAGNOSIS — M70.71 BURSITIS OF RIGHT HIP, UNSPECIFIED BURSA: ICD-10-CM

## 2019-06-13 DIAGNOSIS — I10 ESSENTIAL HYPERTENSION: ICD-10-CM

## 2019-06-13 DIAGNOSIS — M54.50 CHRONIC LOW BACK PAIN WITHOUT SCIATICA, UNSPECIFIED BACK PAIN LATERALITY: ICD-10-CM

## 2019-06-13 RX ORDER — ALLOPURINOL 300 MG/1
TABLET ORAL
Qty: 90 TABLET | Refills: 1 | Status: SHIPPED | OUTPATIENT
Start: 2019-06-13 | End: 2019-12-17 | Stop reason: SDUPTHER

## 2019-06-13 RX ORDER — HYDROCODONE BITARTRATE AND ACETAMINOPHEN 10; 325 MG/1; MG/1
1 TABLET ORAL EVERY 6 HOURS PRN
Qty: 120 TABLET | Refills: 0 | Status: SHIPPED | OUTPATIENT
Start: 2019-06-13 | End: 2019-07-23 | Stop reason: SDUPTHER

## 2019-06-13 RX ORDER — MELOXICAM 15 MG/1
TABLET ORAL
Qty: 90 TABLET | Refills: 2 | Status: SHIPPED | OUTPATIENT
Start: 2019-06-13 | End: 2019-08-15 | Stop reason: SDUPTHER

## 2019-06-13 RX ORDER — LOSARTAN POTASSIUM 100 MG/1
100 TABLET ORAL DAILY
Qty: 30 TABLET | Refills: 11 | Status: SHIPPED | OUTPATIENT
Start: 2019-06-13 | End: 2020-01-09 | Stop reason: DRUGHIGH

## 2019-06-27 ENCOUNTER — OFFICE VISIT (OUTPATIENT)
Dept: CARDIOLOGY CLINIC | Age: 74
End: 2019-06-27
Payer: MEDICARE

## 2019-06-27 VITALS
SYSTOLIC BLOOD PRESSURE: 122 MMHG | BODY MASS INDEX: 23.84 KG/M2 | HEIGHT: 72 IN | DIASTOLIC BLOOD PRESSURE: 82 MMHG | HEART RATE: 62 BPM | WEIGHT: 176 LBS

## 2019-06-27 DIAGNOSIS — I47.1 PSVT (PAROXYSMAL SUPRAVENTRICULAR TACHYCARDIA) (HCC): Primary | ICD-10-CM

## 2019-06-27 DIAGNOSIS — E66.09 OBESITY DUE TO EXCESS CALORIES WITH SERIOUS COMORBIDITY IN PEDIATRIC PATIENT, UNSPECIFIED BMI: ICD-10-CM

## 2019-06-27 DIAGNOSIS — I10 ESSENTIAL HYPERTENSION: ICD-10-CM

## 2019-06-27 DIAGNOSIS — I95.1 AUTONOMIC ORTHOSTATIC HYPOTENSION: ICD-10-CM

## 2019-06-27 PROCEDURE — G8427 DOCREV CUR MEDS BY ELIG CLIN: HCPCS | Performed by: INTERNAL MEDICINE

## 2019-06-27 PROCEDURE — 1036F TOBACCO NON-USER: CPT | Performed by: INTERNAL MEDICINE

## 2019-06-27 PROCEDURE — 99214 OFFICE O/P EST MOD 30 MIN: CPT | Performed by: INTERNAL MEDICINE

## 2019-06-27 PROCEDURE — 3017F COLORECTAL CA SCREEN DOC REV: CPT | Performed by: INTERNAL MEDICINE

## 2019-06-27 PROCEDURE — 1123F ACP DISCUSS/DSCN MKR DOCD: CPT | Performed by: INTERNAL MEDICINE

## 2019-06-27 PROCEDURE — G8420 CALC BMI NORM PARAMETERS: HCPCS | Performed by: INTERNAL MEDICINE

## 2019-06-27 PROCEDURE — 4040F PNEUMOC VAC/ADMIN/RCVD: CPT | Performed by: INTERNAL MEDICINE

## 2019-06-27 NOTE — PROGRESS NOTES
Today's Date: 6/27/2019  Patient Name: Pilar Cai  Patient's age: 75 y.o., 1945      HPI and CC:    Patient is doing well from a cardiac standpoint. Good functional capacity with no significant change in functional capacity. No chest pain, no dyspnea, no PND, no syncope or pre-syncope, no orthopnea. No symptoms of CHF or angina/chest pain. Has lost 20 pounds in last 6 months    Past Medical History:   has a past medical history of History of PSVT (paroxysmal supraventricular tachycardia), Low back pain, UTI (urinary tract infection), Uvulitis, Vasodepressor syncope, and Wound, open, face. Past Surgical History:   has a past surgical history that includes Colonoscopy and Hip Arthroplasty (Right, 01/07/2019). Home Medications:    Prior to Admission medications    Medication Sig Start Date End Date Taking? Authorizing Provider   meloxicam (MOBIC) 15 MG tablet TAKE 1 TABLET BY MOUTH ONCE DAILY 6/13/19  Yes Renay Calvillo MD   allopurinol (ZYLOPRIM) 300 MG tablet TAKE 1 TABLET BY MOUTH ONCE DAILY 6/13/19  Yes Renay Calvillo MD   losartan (COZAAR) 100 MG tablet Take 1 tablet by mouth daily 6/13/19  Yes Renay Calvillo MD   HYDROcodone-acetaminophen [facility])  MG per tablet Take 1 tablet by mouth every 6 hours as needed for Pain for up to 30 days.  6/13/19 7/13/19 Yes Renay Calvillo MD   meloxicam [facility]) 15 MG tablet Take 1 tablet by mouth daily 5/7/19  Yes Renay Calvillo MD   sildenafil (VIAGRA) 50 MG tablet Take 1 tablet by mouth as needed for Erectile Dysfunction 5/7/19  Yes Renay Calvillo MD   Pseudoephedrine HCl (SUDAFED 12 HOUR PO) Take by mouth daily   Yes Historical Provider, MD   metoprolol tartrate (LOPRESSOR) 25 MG tablet Take 1 tablet by mouth 2 times daily 11/16/18  Yes Renay Calvillo MD   sildenafil (VIAGRA) 100 MG tablet Take 1 tablet by mouth as needed for Erectile Dysfunction 1/17/17  Yes Renay Calvillo MD   aspirin 81 MG tablet Take 81 mg by mouth daily   Yes Historical Provider, MD   hydrALAZINE (APRESOLINE) 10 MG tablet Take 1 tablet by mouth 4 times daily as needed (systolic greater than 523) 1/9/18 1/9/19  Raisa Burks MD       Allergies:  Patient has no known allergies. Social History:   reports that he quit smoking about 43 years ago. His smoking use included cigarettes. He has a 30.00 pack-year smoking history. He has never used smokeless tobacco. He reports that he does not drink alcohol or use drugs. REVIEW OF SYSTEMS:    · Constitutional: there has been no unanticipated weight loss. There's been No change in energy level, No change in activity level. · Eyes: No visual changes or diplopia. No scleral icterus. · ENT: No Headaches, hearing loss or vertigo. No mouth sores or sore throat. · Cardiovascular: No chest pain, no dyspnea, no chf like symptoms  · Respiratory: No previous pulmonary problems  · Gastrointestinal: No abdominal pain, appetite loss, blood in stools. No change in bowel or bladder habits. · Genitourinary: No dysuria, trouble voiding, or hematuria. · Musculoskeletal:  No gait disturbance, No weakness or joint complaints. · Integumentary: No rash or pruritis. · Neurological: No headache, diplopia, change in muscle strength, numbness or tingling. No change in gait, balance, coordination, mood, affect, memory, mentation, behavior. · Psychiatric: No new anxiety or depression. · Endocrine: No temperature intolerance. No excessive thirst, fluid intake, or urination. No tremor. · Hematologic/Lymphatic: No abnormal bruising or bleeding, blood clots or swollen lymph nodes. · Allergic/Immunologic: No nasal congestion or hives.     PHYSICAL EXAM:      /82 (Site: Left Upper Arm, Position: Sitting, Cuff Size: Medium Adult)   Pulse 62   Ht 6' 0.01\" (1.829 m)   Wt 176 lb (79.8 kg)   BMI 23.86 kg/m²   General appearance: alert and cooperative with exam  HEENT: Head: Normocephalic, no lesions, without EXERCISE  CALORIE RESTRICTION  WEIGHT LOSS  CONTINUE CURRENT TREATMENT    F/U 6  MONTHS      Thank you for allowing me to participate in the care of this patient, please do not hesitate to call if you have any questions. Tevin Silvestre, [id number] [facility]  [facility]. [employer]  [phone]

## 2019-07-23 DIAGNOSIS — M70.71 BURSITIS OF RIGHT HIP, UNSPECIFIED BURSA: ICD-10-CM

## 2019-07-23 DIAGNOSIS — G89.29 CHRONIC LOW BACK PAIN WITHOUT SCIATICA, UNSPECIFIED BACK PAIN LATERALITY: ICD-10-CM

## 2019-07-23 DIAGNOSIS — M54.50 CHRONIC LOW BACK PAIN WITHOUT SCIATICA, UNSPECIFIED BACK PAIN LATERALITY: ICD-10-CM

## 2019-07-23 RX ORDER — HYDROCODONE BITARTRATE AND ACETAMINOPHEN 10; 325 MG/1; MG/1
1 TABLET ORAL EVERY 6 HOURS PRN
Qty: 120 TABLET | Refills: 0 | Status: SHIPPED | OUTPATIENT
Start: 2019-07-23 | End: 2019-08-19 | Stop reason: SDUPTHER

## 2019-08-12 ENCOUNTER — TELEPHONE (OUTPATIENT)
Dept: CARDIOLOGY CLINIC | Age: 74
End: 2019-08-12

## 2019-08-15 ENCOUNTER — OFFICE VISIT (OUTPATIENT)
Dept: CARDIOLOGY CLINIC | Age: 74
End: 2019-08-15
Payer: MEDICARE

## 2019-08-15 VITALS
SYSTOLIC BLOOD PRESSURE: 102 MMHG | HEART RATE: 73 BPM | HEIGHT: 72 IN | BODY MASS INDEX: 23.57 KG/M2 | WEIGHT: 174 LBS | DIASTOLIC BLOOD PRESSURE: 62 MMHG

## 2019-08-15 DIAGNOSIS — I10 ESSENTIAL HYPERTENSION: ICD-10-CM

## 2019-08-15 DIAGNOSIS — I95.1 AUTONOMIC ORTHOSTATIC HYPOTENSION: ICD-10-CM

## 2019-08-15 DIAGNOSIS — R55 NEUROCARDIOGENIC PRE-SYNCOPE: Primary | ICD-10-CM

## 2019-08-15 DIAGNOSIS — I47.1 PSVT (PAROXYSMAL SUPRAVENTRICULAR TACHYCARDIA) (HCC): ICD-10-CM

## 2019-08-15 PROCEDURE — 4040F PNEUMOC VAC/ADMIN/RCVD: CPT | Performed by: INTERNAL MEDICINE

## 2019-08-15 PROCEDURE — 1123F ACP DISCUSS/DSCN MKR DOCD: CPT | Performed by: INTERNAL MEDICINE

## 2019-08-15 PROCEDURE — G8420 CALC BMI NORM PARAMETERS: HCPCS | Performed by: INTERNAL MEDICINE

## 2019-08-15 PROCEDURE — 3017F COLORECTAL CA SCREEN DOC REV: CPT | Performed by: INTERNAL MEDICINE

## 2019-08-15 PROCEDURE — G8427 DOCREV CUR MEDS BY ELIG CLIN: HCPCS | Performed by: INTERNAL MEDICINE

## 2019-08-15 PROCEDURE — 99214 OFFICE O/P EST MOD 30 MIN: CPT | Performed by: INTERNAL MEDICINE

## 2019-08-15 PROCEDURE — 1036F TOBACCO NON-USER: CPT | Performed by: INTERNAL MEDICINE

## 2019-08-15 RX ORDER — SENNA PLUS 8.6 MG/1
1 TABLET ORAL DAILY
COMMUNITY

## 2019-08-15 NOTE — PROGRESS NOTES
tablet Take 1 tablet by mouth 2 times daily 11/16/18   Concetta Lebron MD   hydrALAZINE (APRESOLINE) 10 MG tablet Take 1 tablet by mouth 4 times daily as needed (systolic greater than 723) 1/9/18 1/9/19  Concetta Lebron MD   sildenafil (VIAGRA) 100 MG tablet Take 1 tablet by mouth as needed for Erectile Dysfunction 1/17/17   Concetta Lebron MD       Allergies:  Patient has no known allergies. Social History:   reports that he quit smoking about 43 years ago. His smoking use included cigarettes. He has a 30.00 pack-year smoking history. He has never used smokeless tobacco. He reports that he does not drink alcohol or use drugs. REVIEW OF SYSTEMS:    · Constitutional: there has been no unanticipated weight loss. There's been No change in energy level, No change in activity level. · Eyes: No visual changes or diplopia. No scleral icterus. · ENT: No Headaches, hearing loss or vertigo. No mouth sores or sore throat. · Cardiovascular: No chest pain, no dyspnea, no chf like symptoms  · Respiratory: No previous pulmonary problems  · Gastrointestinal: No abdominal pain, appetite loss, blood in stools. No change in bowel or bladder habits. · Genitourinary: No dysuria, trouble voiding, or hematuria. · Musculoskeletal:  No gait disturbance, No weakness or joint complaints. · Integumentary: No rash or pruritis. · Neurological: No headache, diplopia, change in muscle strength, numbness or tingling. No change in gait, balance, coordination, mood, affect, memory, mentation, behavior. · Psychiatric: No new anxiety or depression. · Endocrine: No temperature intolerance. No excessive thirst, fluid intake, or urination. No tremor. · Hematologic/Lymphatic: No abnormal bruising or bleeding, blood clots or swollen lymph nodes. · Allergic/Immunologic: No nasal congestion or hives.     PHYSICAL EXAM:      /62   Pulse 73   Ht 6' (1.829 m)   Wt 174 lb (78.9 kg)   BMI 23.60 kg/m²   General appearance: want to go back on florinef, but he may reconsider. BPs sometimes 34-22 systolic at home. Stop hydralazine  Negative stress test  PRESERVED LV SYSTOLIC FUCNTION   HTN  PSVT, REMOTE HX  WEIGHT Loss  AMBULATORY BP MONITORING   REGULAR EXERCISE  CONTINUE CURRENT TREATMENT    F/U 6  MONTHS      Thank you for allowing me to participate in the care of this patient, please do not hesitate to call if you have any questions. Ke Maria, [id number] [facility]  [facility]. [facility]  [phone]

## 2019-08-19 DIAGNOSIS — G89.29 CHRONIC LOW BACK PAIN WITHOUT SCIATICA, UNSPECIFIED BACK PAIN LATERALITY: ICD-10-CM

## 2019-08-19 DIAGNOSIS — M70.71 BURSITIS OF RIGHT HIP, UNSPECIFIED BURSA: ICD-10-CM

## 2019-08-19 DIAGNOSIS — M54.50 CHRONIC LOW BACK PAIN WITHOUT SCIATICA, UNSPECIFIED BACK PAIN LATERALITY: ICD-10-CM

## 2019-08-19 RX ORDER — HYDROCODONE BITARTRATE AND ACETAMINOPHEN 10; 325 MG/1; MG/1
1 TABLET ORAL EVERY 6 HOURS PRN
Qty: 120 TABLET | Refills: 0 | Status: SHIPPED | OUTPATIENT
Start: 2019-08-19 | End: 2019-09-23 | Stop reason: SDUPTHER

## 2019-09-10 ENCOUNTER — OFFICE VISIT (OUTPATIENT)
Dept: PRIMARY CARE CLINIC | Age: 74
End: 2019-09-10
Payer: MEDICARE

## 2019-09-10 VITALS
BODY MASS INDEX: 24.43 KG/M2 | HEART RATE: 74 BPM | WEIGHT: 180.4 LBS | HEIGHT: 72 IN | SYSTOLIC BLOOD PRESSURE: 126 MMHG | DIASTOLIC BLOOD PRESSURE: 72 MMHG | OXYGEN SATURATION: 90 %

## 2019-09-10 DIAGNOSIS — G89.29 CHRONIC LOW BACK PAIN WITHOUT SCIATICA, UNSPECIFIED BACK PAIN LATERALITY: ICD-10-CM

## 2019-09-10 DIAGNOSIS — Z79.899 HIGH RISK MEDICATION USE: ICD-10-CM

## 2019-09-10 DIAGNOSIS — M54.50 CHRONIC LOW BACK PAIN WITHOUT SCIATICA, UNSPECIFIED BACK PAIN LATERALITY: ICD-10-CM

## 2019-09-10 DIAGNOSIS — M16.11 PRIMARY OSTEOARTHRITIS OF RIGHT HIP: Primary | ICD-10-CM

## 2019-09-10 PROCEDURE — G8420 CALC BMI NORM PARAMETERS: HCPCS | Performed by: FAMILY MEDICINE

## 2019-09-10 PROCEDURE — 3017F COLORECTAL CA SCREEN DOC REV: CPT | Performed by: FAMILY MEDICINE

## 2019-09-10 PROCEDURE — 99213 OFFICE O/P EST LOW 20 MIN: CPT | Performed by: FAMILY MEDICINE

## 2019-09-10 PROCEDURE — G8427 DOCREV CUR MEDS BY ELIG CLIN: HCPCS | Performed by: FAMILY MEDICINE

## 2019-09-10 PROCEDURE — 1036F TOBACCO NON-USER: CPT | Performed by: FAMILY MEDICINE

## 2019-09-10 PROCEDURE — 1123F ACP DISCUSS/DSCN MKR DOCD: CPT | Performed by: FAMILY MEDICINE

## 2019-09-10 PROCEDURE — 4040F PNEUMOC VAC/ADMIN/RCVD: CPT | Performed by: FAMILY MEDICINE

## 2019-09-10 ASSESSMENT — ENCOUNTER SYMPTOMS
ABDOMINAL PAIN: 0
VOMITING: 0
WHEEZING: 0
EYE REDNESS: 0
EYE DISCHARGE: 0
BACK PAIN: 1
RHINORRHEA: 0
COUGH: 0
SHORTNESS OF BREATH: 0
SORE THROAT: 0
NAUSEA: 0
DIARRHEA: 0

## 2019-09-13 DIAGNOSIS — Z79.899 HIGH RISK MEDICATION USE: ICD-10-CM

## 2019-09-23 DIAGNOSIS — M54.50 CHRONIC LOW BACK PAIN WITHOUT SCIATICA, UNSPECIFIED BACK PAIN LATERALITY: ICD-10-CM

## 2019-09-23 DIAGNOSIS — M70.71 BURSITIS OF RIGHT HIP, UNSPECIFIED BURSA: ICD-10-CM

## 2019-09-23 DIAGNOSIS — G89.29 CHRONIC LOW BACK PAIN WITHOUT SCIATICA, UNSPECIFIED BACK PAIN LATERALITY: ICD-10-CM

## 2019-09-23 RX ORDER — HYDROCODONE BITARTRATE AND ACETAMINOPHEN 10; 325 MG/1; MG/1
1 TABLET ORAL EVERY 6 HOURS PRN
Qty: 120 TABLET | Refills: 0 | Status: SHIPPED | OUTPATIENT
Start: 2019-09-23 | End: 2019-10-21 | Stop reason: SDUPTHER

## 2019-10-21 DIAGNOSIS — M70.71 BURSITIS OF RIGHT HIP, UNSPECIFIED BURSA: ICD-10-CM

## 2019-10-21 DIAGNOSIS — G89.29 CHRONIC LOW BACK PAIN WITHOUT SCIATICA, UNSPECIFIED BACK PAIN LATERALITY: ICD-10-CM

## 2019-10-21 DIAGNOSIS — M54.50 CHRONIC LOW BACK PAIN WITHOUT SCIATICA, UNSPECIFIED BACK PAIN LATERALITY: ICD-10-CM

## 2019-10-21 RX ORDER — HYDROCODONE BITARTRATE AND ACETAMINOPHEN 10; 325 MG/1; MG/1
1 TABLET ORAL EVERY 6 HOURS PRN
Qty: 120 TABLET | Refills: 0 | Status: SHIPPED | OUTPATIENT
Start: 2019-10-21 | End: 2019-11-20

## 2019-11-26 DIAGNOSIS — M16.11 PRIMARY OSTEOARTHRITIS OF RIGHT HIP: Primary | ICD-10-CM

## 2019-11-26 RX ORDER — HYDROCODONE BITARTRATE AND ACETAMINOPHEN 10; 325 MG/1; MG/1
1 TABLET ORAL EVERY 6 HOURS PRN
Qty: 120 TABLET | Refills: 0 | Status: SHIPPED | OUTPATIENT
Start: 2019-11-26 | End: 2020-01-07 | Stop reason: SDUPTHER

## 2019-11-26 RX ORDER — HYDROCODONE BITARTRATE AND ACETAMINOPHEN 10; 325 MG/1; MG/1
1 TABLET ORAL EVERY 6 HOURS PRN
COMMUNITY
End: 2019-11-26 | Stop reason: SDUPTHER

## 2019-12-04 DIAGNOSIS — I10 ESSENTIAL HYPERTENSION: ICD-10-CM

## 2019-12-18 DIAGNOSIS — I10 ESSENTIAL HYPERTENSION: ICD-10-CM

## 2019-12-18 RX ORDER — ALLOPURINOL 300 MG/1
TABLET ORAL
Qty: 90 TABLET | Refills: 0 | Status: SHIPPED | OUTPATIENT
Start: 2019-12-18 | End: 2019-12-18 | Stop reason: SDUPTHER

## 2019-12-18 RX ORDER — ALLOPURINOL 300 MG/1
300 TABLET ORAL DAILY
Qty: 90 TABLET | Refills: 3 | Status: SHIPPED | OUTPATIENT
Start: 2019-12-18 | End: 2020-03-16

## 2020-01-07 RX ORDER — HYDROCODONE BITARTRATE AND ACETAMINOPHEN 10; 325 MG/1; MG/1
1 TABLET ORAL EVERY 6 HOURS PRN
Qty: 120 TABLET | Refills: 0 | Status: SHIPPED | OUTPATIENT
Start: 2020-01-07 | End: 2020-02-06

## 2020-01-09 ENCOUNTER — OFFICE VISIT (OUTPATIENT)
Dept: CARDIOLOGY CLINIC | Age: 75
End: 2020-01-09
Payer: MEDICARE

## 2020-01-09 VITALS
HEART RATE: 80 BPM | BODY MASS INDEX: 24.65 KG/M2 | HEIGHT: 72 IN | WEIGHT: 182 LBS | SYSTOLIC BLOOD PRESSURE: 92 MMHG | DIASTOLIC BLOOD PRESSURE: 62 MMHG

## 2020-01-09 PROCEDURE — G8420 CALC BMI NORM PARAMETERS: HCPCS | Performed by: INTERNAL MEDICINE

## 2020-01-09 PROCEDURE — G8484 FLU IMMUNIZE NO ADMIN: HCPCS | Performed by: INTERNAL MEDICINE

## 2020-01-09 PROCEDURE — G8428 CUR MEDS NOT DOCUMENT: HCPCS | Performed by: INTERNAL MEDICINE

## 2020-01-09 PROCEDURE — 3017F COLORECTAL CA SCREEN DOC REV: CPT | Performed by: INTERNAL MEDICINE

## 2020-01-09 PROCEDURE — 99214 OFFICE O/P EST MOD 30 MIN: CPT | Performed by: INTERNAL MEDICINE

## 2020-01-09 PROCEDURE — 4040F PNEUMOC VAC/ADMIN/RCVD: CPT | Performed by: INTERNAL MEDICINE

## 2020-01-09 PROCEDURE — 1123F ACP DISCUSS/DSCN MKR DOCD: CPT | Performed by: INTERNAL MEDICINE

## 2020-01-09 PROCEDURE — 1036F TOBACCO NON-USER: CPT | Performed by: INTERNAL MEDICINE

## 2020-01-09 RX ORDER — DIPHENHYDRAMINE HCL 25 MG
25 TABLET ORAL DAILY
COMMUNITY
End: 2022-04-27

## 2020-01-09 RX ORDER — LOSARTAN POTASSIUM 50 MG/1
50 TABLET ORAL DAILY
COMMUNITY
End: 2020-02-06

## 2020-01-09 NOTE — PROGRESS NOTES
Today's Date: 1/9/2020  Patient Name: Mila Culver  Patient's age: 75 y.o., 1945      HPI and CC:    Patient is doing well from a cardiac standpoint. Good functional capacity with no significant change in functional capacity. No chest pain, no dyspnea, no PND, no syncope or pre-syncope, no orthopnea. No symptoms of CHF or angina/chest pain. Has lost 20 pounds in last 6 months    Past Medical History:   has a past medical history of History of PSVT (paroxysmal supraventricular tachycardia), Low back pain, UTI (urinary tract infection), Uvulitis, Vasodepressor syncope, and Wound, open, face. Past Surgical History:   has a past surgical history that includes Colonoscopy and Hip Arthroplasty (Right, 01/07/2019). Home Medications:    Prior to Admission medications    Medication Sig Start Date End Date Taking? Authorizing Provider   losartan (COZAAR) 50 MG tablet Take 50 mg by mouth daily   Yes Historical Provider, MD   diphenhydrAMINE (BENADRYL) 25 MG tablet Take 25 mg by mouth daily   Yes Historical Provider, MD   HYDROcodone-acetaminophen (NORCO)  MG per tablet Take 1 tablet by mouth every 6 hours as needed for Pain for up to 30 days.  1/7/20 2/6/20 Yes Fiona Arriaga MD   allopurinol (ZYLOPRIM) 300 MG tablet Take 1 tablet by mouth daily 12/18/19  Yes Fiona Arriaga MD   metoprolol tartrate (LOPRESSOR) 25 MG tablet TAKE 1 TABLET BY MOUTH TWICE DAILY 12/4/19  Yes Fiona Arriaga MD   senna (SENOKOT) 8.6 MG tablet Take 1 tablet by mouth daily   Yes Historical Provider, MD   meloxicam (MOBIC) 15 MG tablet Take 1 tablet by mouth daily 5/7/19  Yes Fiona Arriaga MD   Pseudoephedrine HCl (SUDAFED 12 HOUR PO) Take 1 tablet by mouth nightly Planning to switch to morning   Yes Historical Provider, MD   aspirin 81 MG tablet Take 81 mg by mouth daily   Yes Historical Provider, MD   NONFORMULARY Herbal supplement for allergy nasal stuffiness: \"lions antionette and mushroom\"    Historical Normocephalic, no lesions, without obvious abnormality. Eyes: PERRL, EOMI  Ears: Not obvious deformations or lack of hearing  Neck: no carotid bruit, no JVD  Lungs: clear to auscultation bilaterally  Heart: regular rate and rhythm, S1, S2 normal, no murmur, click, rub or gallop  Abdomen: soft, non-tender; bowel sounds normal; no masses,  no organomegaly  Extremities: extremities normal, atraumatic, no cyanosis or edema  Neurologic: Mental status: Alert, oriented, thought content appropriate  Skin: WNL for age and condition, no obvious rashes or leasions    Cardiac data:      Labs:     CBC: No results for input(s): WBC, HGB, HCT, PLT in the last 72 hours. BMP: No results for input(s): NA, K, CO2, BUN, CREATININE, LABGLOM, GLUCOSE in the last 72 hours. PT/INR: No results for input(s): PROTIME, INR in the last 72 hours. FASTING LIPID PANEL:  Lab Results   Component Value Date    HDL 41 10/25/2017    LDLCALC 60 10/25/2017    TRIG 58 10/25/2017     LIVER PROFILE:No results for input(s): AST, ALT, LABALBU in the last 72 hours. Patient Active Problem List   Diagnosis    Allergic rhinitis    Arterial insufficiency (HCC)    Autonomic nervous system disorder    Diverticulosis    Fatty liver    Gout    Lower back pain    Male erectile disorder    Polyarthritis    Raynaud's disease    Anxiety    Vasodepressor syncope    History of PSVT (paroxysmal supraventricular tachycardia)    Gastroesophageal reflux disease without esophagitis    Essential hypertension    Primary osteoarthritis of right hip     IMPRESSION & RECOMMENDATIONS:    Did well hip surgery. Has lost 20 pounds since. Vasodepressor syncope (+ tilt table test 7/2014)- stable, no symptoms. Had stopped florinef, because pharmacy couldn't get it and his pressures have been higher and he feels it was because he was dehydrated that he passed out.  Recently has been having lower pressures, still doesn't want to go back on florinef, but he may

## 2020-02-06 ENCOUNTER — OFFICE VISIT (OUTPATIENT)
Dept: CARDIOLOGY CLINIC | Age: 75
End: 2020-02-06
Payer: MEDICARE

## 2020-02-06 VITALS
DIASTOLIC BLOOD PRESSURE: 70 MMHG | HEART RATE: 78 BPM | SYSTOLIC BLOOD PRESSURE: 130 MMHG | BODY MASS INDEX: 24.92 KG/M2 | WEIGHT: 184 LBS | HEIGHT: 72 IN

## 2020-02-06 PROCEDURE — 1123F ACP DISCUSS/DSCN MKR DOCD: CPT | Performed by: INTERNAL MEDICINE

## 2020-02-06 PROCEDURE — G8420 CALC BMI NORM PARAMETERS: HCPCS | Performed by: INTERNAL MEDICINE

## 2020-02-06 PROCEDURE — 4040F PNEUMOC VAC/ADMIN/RCVD: CPT | Performed by: INTERNAL MEDICINE

## 2020-02-06 PROCEDURE — G8427 DOCREV CUR MEDS BY ELIG CLIN: HCPCS | Performed by: INTERNAL MEDICINE

## 2020-02-06 PROCEDURE — 1036F TOBACCO NON-USER: CPT | Performed by: INTERNAL MEDICINE

## 2020-02-06 PROCEDURE — 3017F COLORECTAL CA SCREEN DOC REV: CPT | Performed by: INTERNAL MEDICINE

## 2020-02-06 PROCEDURE — G8484 FLU IMMUNIZE NO ADMIN: HCPCS | Performed by: INTERNAL MEDICINE

## 2020-02-06 PROCEDURE — 99214 OFFICE O/P EST MOD 30 MIN: CPT | Performed by: INTERNAL MEDICINE

## 2020-02-06 RX ORDER — SILDENAFIL 100 MG/1
100 TABLET, FILM COATED ORAL PRN
COMMUNITY

## 2020-02-06 NOTE — PROGRESS NOTES
Today's Date: 2/6/2020  Patient Name: Shreya Bradshaw  Patient's age: 75 y.o., 1945      HPI and CC:    Patient is doing well from a cardiac standpoint. Good functional capacity with no significant change in functional capacity. No chest pain, no dyspnea, no PND, no syncope or pre-syncope, no orthopnea. No symptoms of CHF or angina/chest pain. Has lost 20 pounds in last 6 months    Doing much better off BP meds, has not required florinef. Past Medical History:   has a past medical history of History of PSVT (paroxysmal supraventricular tachycardia), Low back pain, UTI (urinary tract infection), Uvulitis, Vasodepressor syncope, and Wound, open, face. Past Surgical History:   has a past surgical history that includes Colonoscopy and Hip Arthroplasty (Right, 01/07/2019). Home Medications:    Prior to Admission medications    Medication Sig Start Date End Date Taking? Authorizing Provider   NONFORMULARY Take 1 capsule by mouth 2 times daily Cogniuim   Yes Historical Provider, MD   diphenhydrAMINE (BENADRYL) 25 MG tablet Take 25 mg by mouth daily   Yes Historical Provider, MD   HYDROcodone-acetaminophen (NORCO)  MG per tablet Take 1 tablet by mouth every 6 hours as needed for Pain for up to 30 days.  1/7/20 2/6/20 Yes Jai Carrillo MD   allopurinol (ZYLOPRIM) 300 MG tablet Take 1 tablet by mouth daily 12/18/19  Yes Jai Carrillo MD   metoprolol tartrate (LOPRESSOR) 25 MG tablet TAKE 1 TABLET BY MOUTH TWICE DAILY 12/4/19  Yes Jai Carrillo MD   senna (SENOKOT) 8.6 MG tablet Take 1 tablet by mouth daily   Yes Historical Provider, MD   meloxicam (MOBIC) 15 MG tablet Take 1 tablet by mouth daily 5/7/19  Yes Jai Carrillo MD   Pseudoephedrine HCl (SUDAFED 12 HOUR PO) Take 1 tablet by mouth nightly Planning to switch to morning   Yes Historical Provider, MD   aspirin 81 MG tablet Take 81 mg by mouth daily   Yes Historical Provider, MD   sildenafil (VIAGRA) 100 MG tablet Take 100 mg by mouth as needed for Erectile Dysfunction    Historical Provider, MD       Allergies:  Patient has no known allergies. Social History:   reports that he quit smoking about 44 years ago. His smoking use included cigarettes. He has a 30.00 pack-year smoking history. He has never used smokeless tobacco. He reports that he does not drink alcohol or use drugs. REVIEW OF SYSTEMS:    · Constitutional: there has been no unanticipated weight loss. There's been No change in energy level, No change in activity level. · Eyes: No visual changes or diplopia. No scleral icterus. · ENT: No Headaches, hearing loss or vertigo. No mouth sores or sore throat. · Cardiovascular: No chest pain, no dyspnea, no chf like symptoms  · Respiratory: No previous pulmonary problems  · Gastrointestinal: No abdominal pain, appetite loss, blood in stools. No change in bowel or bladder habits. · Genitourinary: No dysuria, trouble voiding, or hematuria. · Musculoskeletal:  No gait disturbance, No weakness or joint complaints. · Integumentary: No rash or pruritis. · Neurological: No headache, diplopia, change in muscle strength, numbness or tingling. No change in gait, balance, coordination, mood, affect, memory, mentation, behavior. · Psychiatric: No new anxiety or depression. · Endocrine: No temperature intolerance. No excessive thirst, fluid intake, or urination. No tremor. · Hematologic/Lymphatic: No abnormal bruising or bleeding, blood clots or swollen lymph nodes. · Allergic/Immunologic: No nasal congestion or hives. PHYSICAL EXAM:      /70 (Site: Left Upper Arm, Position: Sitting)   Pulse 78   Ht 6' (1.829 m)   Wt 184 lb (83.5 kg)   BMI 24.95 kg/m²   General appearance: alert and cooperative with exam  HEENT: Head: Normocephalic, no lesions, without obvious abnormality.   Eyes: PERRL, EOMI  Ears: Not obvious deformations or lack of hearing  Neck: no carotid bruit, no JVD  Lungs: clear to auscultation bilaterally  Heart: regular rate and rhythm, S1, S2 normal, no murmur, click, rub or gallop  Abdomen: soft, non-tender; bowel sounds normal; no masses,  no organomegaly  Extremities: extremities normal, atraumatic, no cyanosis or edema  Neurologic: Mental status: Alert, oriented, thought content appropriate  Skin: WNL for age and condition, no obvious rashes or leasions    Cardiac data:      Labs:     CBC: No results for input(s): WBC, HGB, HCT, PLT in the last 72 hours. BMP: No results for input(s): NA, K, CO2, BUN, CREATININE, LABGLOM, GLUCOSE in the last 72 hours. PT/INR: No results for input(s): PROTIME, INR in the last 72 hours. FASTING LIPID PANEL:  Lab Results   Component Value Date    HDL 41 10/25/2017    LDLCALC 60 10/25/2017    TRIG 58 10/25/2017     LIVER PROFILE:No results for input(s): AST, ALT, LABALBU in the last 72 hours. Patient Active Problem List   Diagnosis    Allergic rhinitis    Arterial insufficiency (HCC)    Autonomic nervous system disorder    Diverticulosis    Fatty liver    Gout    Lower back pain    Male erectile disorder    Polyarthritis    Raynaud's disease    Anxiety    Vasodepressor syncope    History of PSVT (paroxysmal supraventricular tachycardia)    Gastroesophageal reflux disease without esophagitis    Essential hypertension    Primary osteoarthritis of right hip     IMPRESSION & RECOMMENDATIONS:      Vasodepressor syncope (+ tilt table test 7/2014)- stable, no symptoms. No longer on losartan and BP doing well. See prior notes. Negative stress test  PRESERVED LV SYSTOLIC FUCNTION   HTN  PSVT, REMOTE HX  WEIGHT Loss  AMBULATORY BP MONITORING   REGULAR EXERCISE  CONTINUE CURRENT TREATMENT  Did well hip surgery. Has lost 20 pounds since. F/U 6  MONTHS      Thank you for allowing me to participate in the care of this patient, please do not hesitate to call if you have any questions.     Zachary Balderrama, [id number] [facility]

## 2020-02-11 ENCOUNTER — TELEPHONE (OUTPATIENT)
Dept: PRIMARY CARE CLINIC | Age: 75
End: 2020-02-11

## 2020-02-11 RX ORDER — HYDROCODONE BITARTRATE AND ACETAMINOPHEN 10; 325 MG/1; MG/1
1 TABLET ORAL EVERY 6 HOURS PRN
Qty: 120 TABLET | Refills: 0 | Status: SHIPPED | OUTPATIENT
Start: 2020-02-11 | End: 2020-03-13 | Stop reason: SDUPTHER

## 2020-02-11 RX ORDER — HYDROCODONE BITARTRATE AND ACETAMINOPHEN 10; 325 MG/1; MG/1
1 TABLET ORAL EVERY 6 HOURS PRN
COMMUNITY
End: 2020-02-11 | Stop reason: SDUPTHER

## 2020-02-11 NOTE — TELEPHONE ENCOUNTER
Last OV 05/07/19  Next OV 02/25/20    Patient is requesting a refill for his Primary osteoarthritis of right hip.     Please contact pt [phone]

## 2020-02-25 ENCOUNTER — OFFICE VISIT (OUTPATIENT)
Dept: PRIMARY CARE CLINIC | Age: 75
End: 2020-02-25
Payer: MEDICARE

## 2020-02-25 VITALS
HEIGHT: 72 IN | BODY MASS INDEX: 25.41 KG/M2 | WEIGHT: 187.6 LBS | OXYGEN SATURATION: 97 % | HEART RATE: 75 BPM | DIASTOLIC BLOOD PRESSURE: 74 MMHG | SYSTOLIC BLOOD PRESSURE: 128 MMHG

## 2020-02-25 PROCEDURE — G8427 DOCREV CUR MEDS BY ELIG CLIN: HCPCS | Performed by: FAMILY MEDICINE

## 2020-02-25 PROCEDURE — 1123F ACP DISCUSS/DSCN MKR DOCD: CPT | Performed by: FAMILY MEDICINE

## 2020-02-25 PROCEDURE — 90662 IIV NO PRSV INCREASED AG IM: CPT | Performed by: FAMILY MEDICINE

## 2020-02-25 PROCEDURE — G8417 CALC BMI ABV UP PARAM F/U: HCPCS | Performed by: FAMILY MEDICINE

## 2020-02-25 PROCEDURE — 1036F TOBACCO NON-USER: CPT | Performed by: FAMILY MEDICINE

## 2020-02-25 PROCEDURE — 3017F COLORECTAL CA SCREEN DOC REV: CPT | Performed by: FAMILY MEDICINE

## 2020-02-25 PROCEDURE — 99213 OFFICE O/P EST LOW 20 MIN: CPT | Performed by: FAMILY MEDICINE

## 2020-02-25 PROCEDURE — G8482 FLU IMMUNIZE ORDER/ADMIN: HCPCS | Performed by: FAMILY MEDICINE

## 2020-02-25 PROCEDURE — 4040F PNEUMOC VAC/ADMIN/RCVD: CPT | Performed by: FAMILY MEDICINE

## 2020-02-25 PROCEDURE — G0008 ADMIN INFLUENZA VIRUS VAC: HCPCS | Performed by: FAMILY MEDICINE

## 2020-02-25 ASSESSMENT — ENCOUNTER SYMPTOMS
EYE REDNESS: 0
VOMITING: 0
NAUSEA: 0
ABDOMINAL PAIN: 0
WHEEZING: 0
DIARRHEA: 0
EYE DISCHARGE: 0
COUGH: 0
SHORTNESS OF BREATH: 0
RHINORRHEA: 0
SORE THROAT: 0

## 2020-02-25 ASSESSMENT — PATIENT HEALTH QUESTIONNAIRE - PHQ9
SUM OF ALL RESPONSES TO PHQ QUESTIONS 1-9: 0
1. LITTLE INTEREST OR PLEASURE IN DOING THINGS: 0
SUM OF ALL RESPONSES TO PHQ9 QUESTIONS 1 & 2: 0
SUM OF ALL RESPONSES TO PHQ QUESTIONS 1-9: 0
2. FEELING DOWN, DEPRESSED OR HOPELESS: 0

## 2020-02-25 NOTE — PROGRESS NOTES
daily Cogniuim      diphenhydrAMINE (BENADRYL) 25 MG tablet Take 25 mg by mouth daily      allopurinol (ZYLOPRIM) 300 MG tablet Take 1 tablet by mouth daily 90 tablet 3    metoprolol tartrate (LOPRESSOR) 25 MG tablet TAKE 1 TABLET BY MOUTH TWICE DAILY 180 tablet 3    senna (SENOKOT) 8.6 MG tablet Take 1 tablet by mouth daily      meloxicam (MOBIC) 15 MG tablet Take 1 tablet by mouth daily 90 tablet 3    Pseudoephedrine HCl (SUDAFED 12 HOUR PO) Take 1 tablet by mouth nightly Planning to switch to morning      aspirin 81 MG tablet Take 81 mg by mouth daily       No current facility-administered medications for this visit. No Known Allergies    Health Maintenance   Topic Date Due    Shingles Vaccine (1 of 2) 04/06/1994    Potassium monitoring  04/04/2020    Creatinine monitoring  04/04/2020    Lipid screen  10/25/2022    DTaP/Tdap/Td vaccine (2 - Td) 05/06/2025    Colon cancer screen colonoscopy  02/08/2026    Flu vaccine  Completed    Pneumococcal 65+ years Vaccine  Completed    AAA screen  Completed    Hepatitis A vaccine  Aged Out    Hepatitis B vaccine  Aged Out    Hib vaccine  Aged Out    Meningococcal (ACWY) vaccine  Aged Out       Subjective:      Review of Systems   Constitutional: Negative for chills and fever. HENT: Negative for rhinorrhea and sore throat. Eyes: Negative for discharge and redness. Respiratory: Negative for cough, shortness of breath and wheezing. Cardiovascular: Negative for chest pain and palpitations. Gastrointestinal: Negative for abdominal pain, diarrhea, nausea and vomiting. Genitourinary: Negative for dysuria and frequency. Musculoskeletal: Negative for arthralgias and myalgias. Neurological: Negative for dizziness, light-headedness and headaches. Psychiatric/Behavioral: Negative for sleep disturbance.        Objective:     /74   Pulse 75   Ht 6' (1.829 m)   Wt 187 lb 9.6 oz (85.1 kg)   SpO2 97%   BMI 25.44 kg/m²   Physical HD)    4. Encounter for lipid screening for cardiovascular disease  Lipid profile ordered. .  - Lipid, Fasting; Future    5. Idiopathic gout, unspecified chronicity, unspecified site  Stable. Check uric acid level. - Uric Acid; Future    6. Screening PSA (prostate specific antigen)  PSA ordered. - PSA Screening; Future      Assessment:       Diagnosis Orders   1. Arterial insufficiency ([facility] [address]     2. Essential hypertension  Basic Metabolic Panel, Fasting   3. Need for viral immunization  INFLUENZA, HIGH DOSE, 65 YRS +, IM, PF, PREFILL SYR, 0.5ML (FLUZONE HD)   4. Encounter for lipid screening for cardiovascular disease  Lipid, Fasting   5. Idiopathic gout, unspecified chronicity, unspecified site  Uric Acid   6. Screening PSA (prostate specific antigen)  PSA Screening        Plan:    Continue with pain medication as is. Patient states has been doing very well and living a higher quality of life. Denies any addiction. Blood work ordered. High-dose flu shot today. Return in about 3 months (around 5/25/2020). Orders Placed This Encounter   Procedures    INFLUENZA, HIGH DOSE, 65 YRS +, IM, PF, PREFILL SYR, 0.5ML (FLUZONE HD)    Lipid, Fasting     Standing Status:   Future     Standing Expiration Date:   2/25/2021    Basic Metabolic Panel, Fasting     Standing Status:   Future     Standing Expiration Date:   2/25/2021    PSA Screening     Standing Status:   Future     Standing Expiration Date:   2/25/2021    Uric Acid     Standing Status:   Future     Standing Expiration Date:   2/25/2021     No orders of the defined types were placed in this encounter. Patient given educationalmaterials - see patient instructions. Discussed use, benefit, and side effectsof prescribed medications. All patient questions answered. Pt voiced understanding. Reviewed health maintenance. Instructed to continue current medications, diet andexercise. Patient agreed with treatment plan. Follow up as directed. Electronicallysigned by Rui Sharp MD on 2/25/2020 at 10:23 AM

## 2020-02-27 LAB
ANION GAP SERPL CALCULATED.3IONS-SCNC: 12 MEQ/L (ref 5–13)
BUN BLDV-MCNC: 22 MG/DL (ref 7–18)
CALCIUM SERPL-MCNC: 9.9 MG/DL (ref 8.8–10)
CHLORIDE BLD-SCNC: 102 MMOL/L (ref 98–107)
CHOLESTEROL, FASTING: 115
CHOLESTEROL/HDL RELATIVE RISK: 4.11
CHOLESTEROL: 115 MG/DL
CO2: 24 MMOL/L (ref 23–31)
CREAT SERPL-MCNC: 1.4 MG/DL (ref 0.72–1.25)
GFR CALCULATED: 53
GLUCOSE: 133 MG/DL (ref 70–99)
HDLC SERPL-MCNC: 28 MG/DL (ref 35–70)
HDLC SERPL-MCNC: 28 MG/DL (ref 45–60)
LDL CHOLESTEROL CALCULATED: 69 MG/DL (ref 0–160)
LDL CHOLESTEROL: 69 MG/DL
POTASSIUM SERPL-SCNC: 4.2 MMOL/L (ref 3.5–5.1)
PROSTATE SPECIFIC ANTIGEN: 0.92 NG/ML (ref 0.02–4)
PROSTATE SPECIFIC ANTIGEN: NORMAL
SODIUM BLD-SCNC: 138 MMOL/L (ref 136–145)
TRIGL SERPL-MCNC: 91 MG/DL
TRIGLYCERIDE, FASTING: 91
URIC ACID: 5.7 MG/DL (ref 3.5–7.2)
URIC ACID: NORMAL
VLDLC SERPL CALC-MCNC: 18 MG/DL (ref 5–35)

## 2020-03-13 RX ORDER — MELOXICAM 15 MG/1
15 TABLET ORAL DAILY
Qty: 90 TABLET | Refills: 3 | Status: SHIPPED | OUTPATIENT
Start: 2020-03-13 | End: 2020-03-16

## 2020-03-13 RX ORDER — HYDROCODONE BITARTRATE AND ACETAMINOPHEN 10; 325 MG/1; MG/1
1 TABLET ORAL EVERY 6 HOURS PRN
Qty: 120 TABLET | Refills: 0 | Status: SHIPPED | OUTPATIENT
Start: 2020-03-13 | End: 2020-04-17 | Stop reason: SDUPTHER

## 2020-03-16 ENCOUNTER — OFFICE VISIT (OUTPATIENT)
Dept: PRIMARY CARE CLINIC | Age: 75
End: 2020-03-16
Payer: MEDICARE

## 2020-03-16 VITALS
HEART RATE: 50 BPM | BODY MASS INDEX: 25.14 KG/M2 | WEIGHT: 185.6 LBS | SYSTOLIC BLOOD PRESSURE: 128 MMHG | DIASTOLIC BLOOD PRESSURE: 78 MMHG | OXYGEN SATURATION: 98 % | HEIGHT: 72 IN

## 2020-03-16 LAB — HBA1C MFR BLD: 5.6 %

## 2020-03-16 PROCEDURE — 99213 OFFICE O/P EST LOW 20 MIN: CPT | Performed by: FAMILY MEDICINE

## 2020-03-16 PROCEDURE — 1123F ACP DISCUSS/DSCN MKR DOCD: CPT | Performed by: FAMILY MEDICINE

## 2020-03-16 PROCEDURE — G8482 FLU IMMUNIZE ORDER/ADMIN: HCPCS | Performed by: FAMILY MEDICINE

## 2020-03-16 PROCEDURE — G8427 DOCREV CUR MEDS BY ELIG CLIN: HCPCS | Performed by: FAMILY MEDICINE

## 2020-03-16 PROCEDURE — 3017F COLORECTAL CA SCREEN DOC REV: CPT | Performed by: FAMILY MEDICINE

## 2020-03-16 PROCEDURE — 1036F TOBACCO NON-USER: CPT | Performed by: FAMILY MEDICINE

## 2020-03-16 PROCEDURE — 4040F PNEUMOC VAC/ADMIN/RCVD: CPT | Performed by: FAMILY MEDICINE

## 2020-03-16 PROCEDURE — G8417 CALC BMI ABV UP PARAM F/U: HCPCS | Performed by: FAMILY MEDICINE

## 2020-03-16 PROCEDURE — 83036 HEMOGLOBIN GLYCOSYLATED A1C: CPT | Performed by: FAMILY MEDICINE

## 2020-03-16 RX ORDER — ALLOPURINOL 300 MG/1
300 TABLET ORAL DAILY
Qty: 90 TABLET | Refills: 3 | Status: SHIPPED | OUTPATIENT
Start: 2020-03-16 | End: 2021-04-07

## 2020-03-16 ASSESSMENT — ENCOUNTER SYMPTOMS
COUGH: 0
DIARRHEA: 0
NAUSEA: 0
SORE THROAT: 0
EYE REDNESS: 0
ABDOMINAL PAIN: 0
SHORTNESS OF BREATH: 0
EYE DISCHARGE: 0
RHINORRHEA: 0
WHEEZING: 0
VOMITING: 0

## 2020-03-16 NOTE — PROGRESS NOTES
[facility]  [address]  [facility] [address]  Dept: [phone]    Georgia Arevalo is a 75 y.o. male who presents today for his medical conditions/complaintsas noted below. Chief Complaint   Patient presents with    Discuss Labs     Get A1C done       HPI:     HPI  Pt here to go over test results. Has been using mobic. No chest pain. Has not been diabetic. Pt has been using Norco for right hip and back pain. Patient has been using meloxicam for arthritis of the hands.     LDL Cholesterol (mg/dL)   Date Value   2020 69     LDL Calculated (mg/dL)   Date Value   2020 69   10/25/2017 60   2016 46       (goal LDL is <100)   BUN (mg/dL)   Date Value   2020 22 (H)     BP Readings from Last 3 Encounters:   20 128/78   20 128/74   20 130/70          (goal 120/80)    Past Medical History:   Diagnosis Date    History of PSVT (paroxysmal supraventricular tachycardia) 2016    Low back pain     UTI (urinary tract infection)     Uvulitis     Vasodepressor syncope 2016    Wound, open, face       Past Surgical History:   Procedure Laterality Date    COLONOSCOPY      HIP ARTHROPLASTY Right 2019       Family History   Problem Relation Age of Onset    Cancer Mother     Diabetes Mother     Alcohol Abuse Father        Social History     Tobacco Use    Smoking status: Former Smoker     Packs/day: 2.00     Years: 15.00     Pack years: 30.00     Types: Cigarettes     Last attempt to quit: 1976     Years since quittin.2    Smokeless tobacco: Never Used   Substance Use Topics    Alcohol use: No     Alcohol/week: 0.0 standard drinks      Current Outpatient Medications   Medication Sig Dispense Refill    allopurinol (ZYLOPRIM) 300 MG tablet Take 1 tablet by mouth daily 90 tablet 3    HYDROcodone-acetaminophen (NORCO)  MG per tablet Take 1 tablet by mouth every 6 hours as needed for Pain educationalmaterials - see patient instructions. Discussed use, benefit, and side effectsof prescribed medications. All patient questions answered. Pt voiced understanding. Reviewed health maintenance. Instructed to continue current medications, diet andexercise. Patient agreed with treatment plan. Follow up as directed.      Electronicallysigned by Ara Vieira MD on 3/16/2020 at 10:03 AM

## 2020-04-17 RX ORDER — HYDROCODONE BITARTRATE AND ACETAMINOPHEN 10; 325 MG/1; MG/1
1 TABLET ORAL EVERY 6 HOURS PRN
Qty: 120 TABLET | Refills: 0 | Status: SHIPPED | OUTPATIENT
Start: 2020-04-17 | End: 2020-05-15 | Stop reason: SDUPTHER

## 2020-05-15 RX ORDER — HYDROCODONE BITARTRATE AND ACETAMINOPHEN 10; 325 MG/1; MG/1
1 TABLET ORAL EVERY 6 HOURS PRN
Qty: 120 TABLET | Refills: 0 | Status: SHIPPED | OUTPATIENT
Start: 2020-05-15 | End: 2020-06-15 | Stop reason: SDUPTHER

## 2020-06-15 RX ORDER — HYDROCODONE BITARTRATE AND ACETAMINOPHEN 10; 325 MG/1; MG/1
1 TABLET ORAL EVERY 6 HOURS PRN
Qty: 120 TABLET | Refills: 0 | Status: SHIPPED | OUTPATIENT
Start: 2020-06-15 | End: 2020-07-28 | Stop reason: SDUPTHER

## 2020-06-15 NOTE — TELEPHONE ENCOUNTER
Last filled 5/15/20  Last appt 3/16/20  [facility] in [place] listed. Pt has a med ck up with you on 6/23/20.

## 2020-06-23 ENCOUNTER — OFFICE VISIT (OUTPATIENT)
Dept: PRIMARY CARE CLINIC | Age: 75
End: 2020-06-23
Payer: MEDICARE

## 2020-06-23 VITALS
DIASTOLIC BLOOD PRESSURE: 74 MMHG | BODY MASS INDEX: 24.89 KG/M2 | HEIGHT: 72 IN | HEART RATE: 58 BPM | SYSTOLIC BLOOD PRESSURE: 136 MMHG | WEIGHT: 183.8 LBS | OXYGEN SATURATION: 92 %

## 2020-06-23 LAB
AMPHETAMINE SCREEN, URINE: NEGATIVE
BARBITURATE SCREEN, URINE: NEGATIVE
BENZODIAZEPINE SCREEN, URINE: NEGATIVE
BUPRENORPHINE URINE: NEGATIVE
COCAINE METABOLITE SCREEN URINE: NEGATIVE
GABAPENTIN SCREEN, URINE: NEGATIVE
MDMA URINE: NEGATIVE
METHADONE SCREEN, URINE: NEGATIVE
METHAMPHETAMINE, URINE: NEGATIVE
OPIATE SCREEN URINE: POSITIVE
OXYCODONE SCREEN URINE: NEGATIVE
PHENCYCLIDINE SCREEN URINE: NEGATIVE
PROPOXYPHENE SCREEN, URINE: NEGATIVE
THC SCREEN, URINE: NEGATIVE
TRICYCLIC ANTIDEPRESSANTS, UR: NEGATIVE

## 2020-06-23 PROCEDURE — G8427 DOCREV CUR MEDS BY ELIG CLIN: HCPCS | Performed by: FAMILY MEDICINE

## 2020-06-23 PROCEDURE — 1123F ACP DISCUSS/DSCN MKR DOCD: CPT | Performed by: FAMILY MEDICINE

## 2020-06-23 PROCEDURE — G8420 CALC BMI NORM PARAMETERS: HCPCS | Performed by: FAMILY MEDICINE

## 2020-06-23 PROCEDURE — 4040F PNEUMOC VAC/ADMIN/RCVD: CPT | Performed by: FAMILY MEDICINE

## 2020-06-23 PROCEDURE — 99213 OFFICE O/P EST LOW 20 MIN: CPT | Performed by: FAMILY MEDICINE

## 2020-06-23 PROCEDURE — 80305 DRUG TEST PRSMV DIR OPT OBS: CPT | Performed by: FAMILY MEDICINE

## 2020-06-23 PROCEDURE — 1036F TOBACCO NON-USER: CPT | Performed by: FAMILY MEDICINE

## 2020-06-23 ASSESSMENT — ENCOUNTER SYMPTOMS
BACK PAIN: 1
ABDOMINAL PAIN: 0
EYE REDNESS: 0
SHORTNESS OF BREATH: 0
VOMITING: 0
CONSTIPATION: 1
COUGH: 0
NAUSEA: 0
EYE DISCHARGE: 0
SORE THROAT: 0
DIARRHEA: 0
RHINORRHEA: 1
WHEEZING: 0

## 2020-06-23 NOTE — PROGRESS NOTES
allopurinol (ZYLOPRIM) 300 MG tablet Take 1 tablet by mouth daily 90 tablet 3    sildenafil (VIAGRA) 100 MG tablet Take 100 mg by mouth as needed for Erectile Dysfunction      NONFORMULARY Take 1 capsule by mouth 2 times daily Cogniuim      metoprolol tartrate (LOPRESSOR) 25 MG tablet TAKE 1 TABLET BY MOUTH TWICE DAILY 180 tablet 3    senna (SENOKOT) 8.6 MG tablet Take 1 tablet by mouth daily      Pseudoephedrine HCl (SUDAFED 12 HOUR PO) Take 1 tablet by mouth nightly Planning to switch to morning      aspirin 81 MG tablet Take 81 mg by mouth daily      diphenhydrAMINE (BENADRYL) 25 MG tablet Take 25 mg by mouth daily       No current facility-administered medications for this visit. No Known Allergies    Health Maintenance   Topic Date Due    Shingles Vaccine (1 of 2) 04/06/1994    Annual Wellness Visit (AWV)  03/12/2020    Potassium monitoring  02/27/2021    Creatinine monitoring  02/27/2021    DTaP/Tdap/Td vaccine (2 - Td) 05/06/2025    Flu vaccine  Completed    Pneumococcal 65+ years Vaccine  Completed    Hepatitis A vaccine  Aged Out    Hepatitis B vaccine  Aged Out    Hib vaccine  Aged Out    Meningococcal (ACWY) vaccine  Aged Out       Subjective:      Review of Systems   Constitutional: Negative for chills, fatigue and fever. HENT: Positive for rhinorrhea (allergies). Negative for sore throat. Eyes: Negative for discharge and redness. Respiratory: Negative for cough, shortness of breath and wheezing. Cardiovascular: Negative for chest pain and palpitations. Gastrointestinal: Positive for constipation (opioid use). Negative for abdominal pain, diarrhea, nausea and vomiting. Genitourinary: Negative for dysuria and frequency. Musculoskeletal: Positive for arthralgias and back pain. Negative for myalgias. Neurological: Negative for dizziness, light-headedness and headaches. Psychiatric/Behavioral: Negative for dysphoric mood and sleep disturbance.        Objective:

## 2020-07-28 RX ORDER — HYDROCODONE BITARTRATE AND ACETAMINOPHEN 10; 325 MG/1; MG/1
1 TABLET ORAL EVERY 6 HOURS PRN
Qty: 120 TABLET | Refills: 0 | Status: SHIPPED | OUTPATIENT
Start: 2020-07-28 | End: 2020-10-02 | Stop reason: SDUPTHER

## 2020-10-02 RX ORDER — HYDROCODONE BITARTRATE AND ACETAMINOPHEN 10; 325 MG/1; MG/1
1 TABLET ORAL EVERY 6 HOURS PRN
Qty: 120 TABLET | Refills: 0 | Status: SHIPPED | OUTPATIENT
Start: 2020-10-02 | End: 2020-10-30 | Stop reason: SDUPTHER

## 2020-10-06 ENCOUNTER — OFFICE VISIT (OUTPATIENT)
Dept: PRIMARY CARE CLINIC | Age: 75
End: 2020-10-06
Payer: MEDICARE

## 2020-10-06 VITALS
DIASTOLIC BLOOD PRESSURE: 80 MMHG | HEART RATE: 73 BPM | HEIGHT: 72 IN | TEMPERATURE: 97.2 F | BODY MASS INDEX: 24.33 KG/M2 | SYSTOLIC BLOOD PRESSURE: 130 MMHG | OXYGEN SATURATION: 93 % | WEIGHT: 179.6 LBS

## 2020-10-06 PROBLEM — M16.11 PRIMARY OSTEOARTHRITIS OF RIGHT HIP: Status: RESOLVED | Noted: 2018-05-01 | Resolved: 2020-10-06

## 2020-10-06 PROCEDURE — 90694 VACC AIIV4 NO PRSRV 0.5ML IM: CPT | Performed by: FAMILY MEDICINE

## 2020-10-06 PROCEDURE — G0439 PPPS, SUBSEQ VISIT: HCPCS | Performed by: FAMILY MEDICINE

## 2020-10-06 PROCEDURE — G8484 FLU IMMUNIZE NO ADMIN: HCPCS | Performed by: FAMILY MEDICINE

## 2020-10-06 PROCEDURE — 1123F ACP DISCUSS/DSCN MKR DOCD: CPT | Performed by: FAMILY MEDICINE

## 2020-10-06 PROCEDURE — G0008 ADMIN INFLUENZA VIRUS VAC: HCPCS | Performed by: FAMILY MEDICINE

## 2020-10-06 PROCEDURE — 4040F PNEUMOC VAC/ADMIN/RCVD: CPT | Performed by: FAMILY MEDICINE

## 2020-10-06 ASSESSMENT — PATIENT HEALTH QUESTIONNAIRE - PHQ9
SUM OF ALL RESPONSES TO PHQ QUESTIONS 1-9: 0
1. LITTLE INTEREST OR PLEASURE IN DOING THINGS: 0
SUM OF ALL RESPONSES TO PHQ9 QUESTIONS 1 & 2: 0
2. FEELING DOWN, DEPRESSED OR HOPELESS: 0
SUM OF ALL RESPONSES TO PHQ QUESTIONS 1-9: 0

## 2020-10-06 NOTE — PROGRESS NOTES
Medicare Annual Wellness Visit  Name: Janny Hardy Date: 10/6/2020   MRN: [id number] Sex: Male   Age: 67 y.o. Ethnicity: Non-/Non    : 1945 Race: Jennifer Fenton is here for Medicare AWV and Flu Vaccine    Screenings for behavioral, psychosocial and functional/safety risks, and cognitive dysfunction are all negative except as indicated below. These results, as well as other patient data from the [address] form, are documented in Flowsheets linked to this Encounter. No Known Allergies      Prior to Visit Medications    Medication Sig Taking? Authorizing Provider   HYDROcodone-acetaminophen (NORCO)  MG per tablet Take 1 tablet by mouth every 6 hours as needed for Pain for up to 30 days.  Yes Mary Jo Guido MD   allopurinol (ZYLOPRIM) 300 MG tablet Take 1 tablet by mouth daily Yes Mary Jo Guido MD   sildenafil (VIAGRA) 100 MG tablet Take 100 mg by mouth as needed for Erectile Dysfunction Yes Historical Provider, MD   NONFORMULARY Take 1 capsule by mouth 2 times daily Cogniuim Yes Historical Provider, MD   diphenhydrAMINE (BENADRYL) 25 MG tablet Take 25 mg by mouth daily Yes Historical Provider, MD   senna (SENOKOT) 8.6 MG tablet Take 1 tablet by mouth daily Yes Historical Provider, MD   aspirin 81 MG tablet Take 81 mg by mouth daily Yes Historical Provider, MD         Past Medical History:   Diagnosis Date    History of PSVT (paroxysmal supraventricular tachycardia) 2016    Low back pain     UTI (urinary tract infection)     Uvulitis     Vasodepressor syncope 2016    Wound, open, face        Past Surgical History:   Procedure Laterality Date    COLONOSCOPY      HIP ARTHROPLASTY Right 2019         Family History   Problem Relation Age of Onset    Cancer Mother     Diabetes Mother     Alcohol Abuse Father        CareTeam (Including outside providers/suppliers regularly involved in providing care):   Patient Care Team:  Samira Freitas Satnam Brown MD as PCP - General (Family Medicine)  Ra Caban MD as PCP - [facility]    Wt Readings from Last 3 Encounters:   10/06/20 179 lb 9.6 oz (81.5 kg)   06/23/20 183 lb 12.8 oz (83.4 kg)   03/16/20 185 lb 9.6 oz (84.2 kg)     Vitals:    10/06/20 1307   BP: 130/80   Pulse: 73   Temp: 97.2 °F (36.2 °C)   SpO2: 93%   Weight: 179 lb 9.6 oz (81.5 kg)   Height: 6' (1.829 m)     Body mass index is 24.36 kg/m². Based upon direct observation of the patient, evaluation of cognition reveals recent and remote memory intact. General Appearance: alert and oriented to person, place and time, well developed and well- nourished, in no acute distress  Skin: warm and dry, no rash or erythema  Head: normocephalic and atraumatic  Eyes: pupils equal, round, and reactive to light, extraocular eye movements intact, conjunctivae normal  ENT: tympanic membrane, external ear and ear canal normal bilaterally, nose without deformity, nasal mucosa and turbinates normal without polyps  Neck: supple and non-tender without mass, no thyromegaly or thyroid nodules, no cervical lymphadenopathy  Pulmonary/Chest: clear to auscultation bilaterally- no wheezes, rales or rhonchi, normal air movement, no respiratory distress  Cardiovascular: normal rate, regular rhythm, normal S1 and S2, no murmurs, rubs, clicks, or gallops, distal pulses intact, no carotid bruits  Abdomen: soft, non-tender, non-distended, normal bowel sounds, no masses or organomegaly  Extremities: no cyanosis, clubbing or edema  Musculoskeletal: normal range of motion, no joint swelling, deformity or tenderness  Neurologic: reflexes normal and symmetric, no cranial nerve deficit, gait, coordination and speech normal    Patient's complete Health Risk Assessment and screening values have been reviewed and are found in Flowsheets.  The following problems were reviewed today and where indicated follow up appointments were made and/or referrals ordered. Positive Risk Factor Screenings with Interventions:     Health Habits/Nutrition:  Health Habits/Nutrition  Do you exercise for at least 20 minutes 2-3 times per week?: Yes  Have you lost any weight without trying in the past 3 months?: No  Do you eat fewer than 2 meals per day?: No  Have you seen a dentist within the past year?: (!) No  Body mass index is 24.36 kg/m².   Health Habits/Nutrition Interventions:  · Dental exam overdue:  patient encouraged to make appointment with his/her dentist    Hearing/Vision:  No exam data present  Hearing/Vision  Do you or your family notice any trouble with your hearing?: (!) Yes  Do you have difficulty driving, watching TV, or doing any of your daily activities because of your eyesight?: No  Have you had an eye exam within the past year?: (!) No  Hearing/Vision Interventions:  · No concerns    Personalized Preventive Plan   Current Health Maintenance Status  Immunization History   Administered Date(s) Administered    Influenza Virus Vaccine 12/04/2013, 09/05/2014, 09/29/2015    Influenza, High Dose (Fluzone 65 yrs and older) 09/26/2016, 09/05/2017, 11/20/2018, 02/25/2020    Influenza, Quadv, adjuvanted, 65 yrs +, IM, PF (Fluad) 10/06/2020    Pneumococcal Conjugate 13-valent (Elige Sleight) 05/06/2015, 09/26/2016    Pneumococcal Polysaccharide ([id number]) 11/21/2005, 04/04/2019    Td, unspecified formulation 11/21/2005    Tdap (Boostrix, Adacel) 05/06/2015        Health Maintenance   Topic Date Due    Shingles Vaccine (1 of 2) 04/06/1994    Annual Wellness Visit (AWV)  03/12/2020    Potassium monitoring  02/27/2021    Creatinine monitoring  02/27/2021    DTaP/Tdap/Td vaccine (2 - Td) 05/06/2025    Flu vaccine  Completed    Pneumococcal 65+ years Vaccine  Completed    Hepatitis A vaccine  Aged Out    Hepatitis B vaccine  Aged Out    Hib vaccine  Aged Out    Meningococcal (ACWY) vaccine  Aged Out     Recommendations for [employer] Due: see orders

## 2020-10-06 NOTE — PATIENT INSTRUCTIONS
Personalized Preventive Plan for [place] - 10/6/2020  Medicare offers a range of preventive health benefits. Some of the tests and screenings are paid in full while other may be subject to a deductible, co-insurance, and/or copay. Some of these benefits include a comprehensive review of your medical history including lifestyle, illnesses that may run in your family, and various assessments and screenings as appropriate. After reviewing your medical record and screening and assessments performed today your provider may have ordered immunizations, labs, imaging, and/or referrals for you. A list of these orders (if applicable) as well as your Preventive Care list are included within your After Visit Summary for your review. Other Preventive Recommendations:    · A preventive eye exam performed by an eye specialist is recommended every 1-2 years to screen for glaucoma; cataracts, macular degeneration, and other eye disorders. · A preventive dental visit is recommended every 6 months. · Try to get at least 150 minutes of exercise per week or 10,000 steps per day on a pedometer . · Order or download the FREE \"Exercise & Physical Activity: Your Everyday Guide\" from [employer]. Call 1-503.489.6237 or search [employer] online. · You need 2891-0129 mg of calcium and 4905-3637 IU of vitamin D per day. It is possible to meet your calcium requirement with diet alone, but a vitamin D supplement is usually necessary to meet this goal.  · When exposed to the sun, use a sunscreen that protects against both UVA and UVB radiation with an SPF of 30 or greater. Reapply every 2 to 3 hours or after sweating, drying off with a towel, or swimming. · Always wear a seat belt when traveling in a car. Always wear a helmet when riding a bicycle or motorcycle.

## 2020-10-30 RX ORDER — HYDROCODONE BITARTRATE AND ACETAMINOPHEN 10; 325 MG/1; MG/1
1 TABLET ORAL EVERY 6 HOURS PRN
Qty: 120 TABLET | Refills: 0 | Status: SHIPPED | OUTPATIENT
Start: 2020-10-30 | End: 2020-12-09 | Stop reason: SDUPTHER

## 2020-12-09 RX ORDER — HYDROCODONE BITARTRATE AND ACETAMINOPHEN 10; 325 MG/1; MG/1
1 TABLET ORAL EVERY 6 HOURS PRN
Qty: 120 TABLET | Refills: 0 | Status: SHIPPED | OUTPATIENT
Start: 2020-12-09 | End: 2021-01-04 | Stop reason: SDUPTHER

## 2021-01-04 DIAGNOSIS — M16.11 PRIMARY OSTEOARTHRITIS OF RIGHT HIP: ICD-10-CM

## 2021-01-04 RX ORDER — HYDROCODONE BITARTRATE AND ACETAMINOPHEN 10; 325 MG/1; MG/1
1 TABLET ORAL EVERY 6 HOURS PRN
Qty: 120 TABLET | Refills: 0 | Status: SHIPPED | OUTPATIENT
Start: 2021-01-04 | End: 2021-02-01 | Stop reason: SDUPTHER

## 2021-02-01 DIAGNOSIS — M16.11 PRIMARY OSTEOARTHRITIS OF RIGHT HIP: ICD-10-CM

## 2021-02-01 RX ORDER — HYDROCODONE BITARTRATE AND ACETAMINOPHEN 10; 325 MG/1; MG/1
1 TABLET ORAL EVERY 6 HOURS PRN
Qty: 120 TABLET | Refills: 0 | Status: SHIPPED | OUTPATIENT
Start: 2021-02-01 | End: 2021-03-01 | Stop reason: SDUPTHER

## 2021-03-01 DIAGNOSIS — M16.11 PRIMARY OSTEOARTHRITIS OF RIGHT HIP: ICD-10-CM

## 2021-03-01 RX ORDER — HYDROCODONE BITARTRATE AND ACETAMINOPHEN 10; 325 MG/1; MG/1
1 TABLET ORAL EVERY 6 HOURS PRN
Qty: 120 TABLET | Refills: 0 | Status: SHIPPED | OUTPATIENT
Start: 2021-03-01 | End: 2021-03-31 | Stop reason: SDUPTHER

## 2021-03-31 DIAGNOSIS — M16.11 PRIMARY OSTEOARTHRITIS OF RIGHT HIP: ICD-10-CM

## 2021-03-31 RX ORDER — HYDROCODONE BITARTRATE AND ACETAMINOPHEN 10; 325 MG/1; MG/1
1 TABLET ORAL EVERY 6 HOURS PRN
Qty: 120 TABLET | Refills: 0 | Status: SHIPPED | OUTPATIENT
Start: 2021-03-31 | End: 2021-05-12 | Stop reason: SDUPTHER

## 2021-04-07 RX ORDER — ALLOPURINOL 300 MG/1
TABLET ORAL
Qty: 90 TABLET | Refills: 0 | Status: SHIPPED | OUTPATIENT
Start: 2021-04-07 | End: 2021-07-13

## 2021-05-07 DIAGNOSIS — M16.11 PRIMARY OSTEOARTHRITIS OF RIGHT HIP: ICD-10-CM

## 2021-05-07 RX ORDER — HYDROCODONE BITARTRATE AND ACETAMINOPHEN 10; 325 MG/1; MG/1
1 TABLET ORAL EVERY 6 HOURS PRN
Qty: 120 TABLET | Refills: 0 | OUTPATIENT
Start: 2021-05-07 | End: 2021-06-06

## 2021-05-12 DIAGNOSIS — M16.11 PRIMARY OSTEOARTHRITIS OF RIGHT HIP: ICD-10-CM

## 2021-05-12 RX ORDER — HYDROCODONE BITARTRATE AND ACETAMINOPHEN 10; 325 MG/1; MG/1
1 TABLET ORAL EVERY 6 HOURS PRN
Qty: 120 TABLET | Refills: 0 | Status: SHIPPED | OUTPATIENT
Start: 2021-05-12 | End: 2021-06-14 | Stop reason: SDUPTHER

## 2021-05-12 NOTE — TELEPHONE ENCOUNTER
Patient is out of medication.  Will you fill until he is seen at [facility] on 5/21/21. Scar Gudino

## 2021-05-21 ENCOUNTER — OFFICE VISIT (OUTPATIENT)
Dept: PRIMARY CARE CLINIC | Age: 76
End: 2021-05-21
Payer: MEDICARE

## 2021-05-21 VITALS
DIASTOLIC BLOOD PRESSURE: 64 MMHG | SYSTOLIC BLOOD PRESSURE: 126 MMHG | HEIGHT: 72 IN | WEIGHT: 182.3 LBS | BODY MASS INDEX: 24.69 KG/M2 | HEART RATE: 91 BPM | OXYGEN SATURATION: 96 %

## 2021-05-21 DIAGNOSIS — I77.1 ARTERIAL INSUFFICIENCY (HCC): ICD-10-CM

## 2021-05-21 DIAGNOSIS — M10.00 IDIOPATHIC GOUT, UNSPECIFIED CHRONICITY, UNSPECIFIED SITE: ICD-10-CM

## 2021-05-21 DIAGNOSIS — I10 ESSENTIAL (PRIMARY) HYPERTENSION: ICD-10-CM

## 2021-05-21 DIAGNOSIS — I47.1 PSVT (PAROXYSMAL SUPRAVENTRICULAR TACHYCARDIA) (HCC): Primary | ICD-10-CM

## 2021-05-21 DIAGNOSIS — Z00.00 ANNUAL PHYSICAL EXAM: ICD-10-CM

## 2021-05-21 DIAGNOSIS — Z79.899 HIGH RISK MEDICATION USE: ICD-10-CM

## 2021-05-21 DIAGNOSIS — Z13.220 ENCOUNTER FOR LIPID SCREENING FOR CARDIOVASCULAR DISEASE: ICD-10-CM

## 2021-05-21 DIAGNOSIS — Z13.6 ENCOUNTER FOR LIPID SCREENING FOR CARDIOVASCULAR DISEASE: ICD-10-CM

## 2021-05-21 DIAGNOSIS — Z12.5 SCREENING PSA (PROSTATE SPECIFIC ANTIGEN): ICD-10-CM

## 2021-05-21 PROBLEM — I47.10 PSVT (PAROXYSMAL SUPRAVENTRICULAR TACHYCARDIA): Status: ACTIVE | Noted: 2021-05-21

## 2021-05-21 LAB
ALCOHOL URINE: NORMAL
AMPHETAMINE SCREEN, URINE: NEGATIVE
BARBITURATE SCREEN, URINE: NEGATIVE
BENZODIAZEPINE SCREEN, URINE: NEGATIVE
BUPRENORPHINE URINE: NEGATIVE
COCAINE METABOLITE SCREEN URINE: NEGATIVE
FENTANYL SCREEN, URINE: NORMAL
GABAPENTIN SCREEN, URINE: NORMAL
MDMA URINE: NEGATIVE
METHADONE SCREEN, URINE: NEGATIVE
METHAMPHETAMINE, URINE: NEGATIVE
OPIATE SCREEN URINE: POSITIVE
OXYCODONE SCREEN URINE: NEGATIVE
PHENCYCLIDINE SCREEN URINE: NORMAL
PROPOXYPHENE SCREEN, URINE: NORMAL
SYNTHETIC CANNABINOIDS(K2) SCREEN, URINE: NORMAL
THC SCREEN, URINE: NEGATIVE
TRAMADOL SCREEN URINE: NORMAL
TRICYCLIC ANTIDEPRESSANTS, UR: NEGATIVE

## 2021-05-21 PROCEDURE — 80305 DRUG TEST PRSMV DIR OPT OBS: CPT | Performed by: FAMILY MEDICINE

## 2021-05-21 PROCEDURE — 1036F TOBACCO NON-USER: CPT | Performed by: FAMILY MEDICINE

## 2021-05-21 PROCEDURE — 99213 OFFICE O/P EST LOW 20 MIN: CPT | Performed by: FAMILY MEDICINE

## 2021-05-21 PROCEDURE — G8427 DOCREV CUR MEDS BY ELIG CLIN: HCPCS | Performed by: FAMILY MEDICINE

## 2021-05-21 PROCEDURE — G8420 CALC BMI NORM PARAMETERS: HCPCS | Performed by: FAMILY MEDICINE

## 2021-05-21 PROCEDURE — 4040F PNEUMOC VAC/ADMIN/RCVD: CPT | Performed by: FAMILY MEDICINE

## 2021-05-21 PROCEDURE — 1123F ACP DISCUSS/DSCN MKR DOCD: CPT | Performed by: FAMILY MEDICINE

## 2021-05-21 SDOH — ECONOMIC STABILITY: FOOD INSECURITY: WITHIN THE PAST 12 MONTHS, YOU WORRIED THAT YOUR FOOD WOULD RUN OUT BEFORE YOU GOT MONEY TO BUY MORE.: NEVER TRUE

## 2021-05-21 ASSESSMENT — ENCOUNTER SYMPTOMS
SORE THROAT: 0
VOMITING: 0
NAUSEA: 0
RHINORRHEA: 0
COUGH: 0
DIARRHEA: 0
SHORTNESS OF BREATH: 0
WHEEZING: 0
EYE REDNESS: 0
ABDOMINAL PAIN: 0
EYE DISCHARGE: 0
BACK PAIN: 1

## 2021-05-21 ASSESSMENT — PATIENT HEALTH QUESTIONNAIRE - PHQ9
SUM OF ALL RESPONSES TO PHQ QUESTIONS 1-9: 0
1. LITTLE INTEREST OR PLEASURE IN DOING THINGS: 0
SUM OF ALL RESPONSES TO PHQ QUESTIONS 1-9: 0
2. FEELING DOWN, DEPRESSED OR HOPELESS: 0
SUM OF ALL RESPONSES TO PHQ QUESTIONS 1-9: 0

## 2021-05-21 ASSESSMENT — SOCIAL DETERMINANTS OF HEALTH (SDOH): HOW HARD IS IT FOR YOU TO PAY FOR THE VERY BASICS LIKE FOOD, HOUSING, MEDICAL CARE, AND HEATING?: NOT HARD AT ALL

## 2021-05-21 NOTE — PROGRESS NOTES
use: No     Alcohol/week: 0.0 standard drinks      Current Outpatient Medications   Medication Sig Dispense Refill    HYDROcodone-acetaminophen (NORCO)  MG per tablet Take 1 tablet by mouth every 6 hours as needed for Pain for up to 30 days. 120 tablet 0    allopurinol (ZYLOPRIM) 300 MG tablet Take 1 tablet by mouth once daily 90 tablet 0    sildenafil (VIAGRA) 100 MG tablet Take 100 mg by mouth as needed for Erectile Dysfunction      NONFORMULARY Take 1 capsule by mouth 2 times daily Cogniuim      diphenhydrAMINE (BENADRYL) 25 MG tablet Take 25 mg by mouth daily      senna (SENOKOT) 8.6 MG tablet Take 1 tablet by mouth daily      aspirin 81 MG tablet Take 81 mg by mouth daily       No current facility-administered medications for this visit. No Known Allergies    Health Maintenance   Topic Date Due    Hepatitis C screen  Never done    COVID-19 Vaccine (1) Never done    Shingles Vaccine (1 of 2) Never done    Potassium monitoring  02/27/2021    Creatinine monitoring  02/27/2021    Annual Wellness Visit (AWV)  10/07/2021    DTaP/Tdap/Td vaccine (2 - Td) 05/06/2025    Flu vaccine  Completed    Pneumococcal 65+ years Vaccine  Completed    Hepatitis A vaccine  Aged Out    Hepatitis B vaccine  Aged Out    Hib vaccine  Aged Out    Meningococcal (ACWY) vaccine  Aged Out       Subjective:      Review of Systems   Constitutional: Negative for chills and fever. HENT: Negative for rhinorrhea and sore throat. Eyes: Negative for discharge and redness. Respiratory: Negative for cough, shortness of breath and wheezing. Cardiovascular: Negative for chest pain and palpitations. Gastrointestinal: Negative for abdominal pain, diarrhea, nausea and vomiting. Genitourinary: Negative for dysuria and frequency. Musculoskeletal: Positive for back pain. Negative for arthralgias and myalgias. Neurological: Negative for dizziness, light-headedness and headaches.    Psychiatric/Behavioral: Negative (paroxysmal supraventricular tachycardia) ([facility] [address]     2. Arterial insufficiency ([facility] [address]     3. Essential (primary) hypertension     4. High risk medication use  POCT Rapid Drug Screen   5. Encounter for lipid screening for cardiovascular disease  Lipid, Fasting   6. Annual physical exam  Basic Metabolic Panel, Fasting    Hepatic Function Panel   7. Screening PSA (prostate specific antigen)  PSA screening   8. Idiopathic gout, unspecified chronicity, unspecified site  Uric Acid        Plan:    renew [facility]  Urine drug screen  Immunizations again discussed     Return in about 6 months (around 11/21/2021). Orders Placed This Encounter   Procedures    Lipid, Fasting     Standing Status:   Future     Standing Expiration Date:   8/21/2021    Basic Metabolic Panel, Fasting     Standing Status:   Future     Standing Expiration Date:   8/21/2021    Hepatic Function Panel     Standing Status:   Future     Standing Expiration Date:   8/21/2021    PSA screening     Standing Status:   Future     Standing Expiration Date:   8/21/2021    Uric Acid     Standing Status:   Future     Standing Expiration Date:   5/21/2022    POCT Rapid Drug Screen     No orders of the defined types were placed in this encounter. Patient given educational materials - see patient instructions. Discussed use, benefit, and side effects of prescribed medications. All patient questions answered. Pt voiced understanding. Reviewed health maintenance. Instructed to continue current medications, diet andexercise. Patient agreed with treatment plan. Follow up as directed.      Electronicallysigned by Lennie Stoll MD on 5/21/2021 at 10:53 AM

## 2021-06-02 LAB
ALBUMIN: 4.3 G/DL (ref 3.2–4.6)
ALP BLD-CCNC: 73 U/L (ref 40–150)
ALT SERPL-CCNC: 49 U/L (ref 0–55)
ANION GAP SERPL CALCULATED.3IONS-SCNC: 11 MEQ/L (ref 5–13)
AST SERPL-CCNC: 45 U/L (ref 5–34)
BILIRUB SERPL-MCNC: 0.8 MG/DL (ref 0–1)
BILIRUBIN DIRECT: 0.4 MG/DL (ref 0.1–0.5)
BUN BLDV-MCNC: 17 MG/DL (ref 7–18)
CALCIUM SERPL-MCNC: 9.3 MG/DL (ref 8.8–10)
CHLORIDE BLD-SCNC: 103 MMOL/L (ref 98–107)
CHOLESTEROL/HDL RELATIVE RISK: 3.16
CHOLESTEROL: 117 MG/DL
CO2: 26 MMOL/L (ref 23–31)
CREAT SERPL-MCNC: 1.16 MG/DL (ref 0.72–1.25)
GFR CALCULATED: > 60
GLUCOSE: 94 MG/DL (ref 70–99)
HDLC SERPL-MCNC: 37 MG/DL (ref 45–60)
LDL CHOLESTEROL: 60 MG/DL
POTASSIUM SERPL-SCNC: 4.1 MMOL/L (ref 3.5–5.1)
PROSTATE SPECIFIC ANTIGEN: 0.93 NG/ML (ref 0.02–4)
SODIUM BLD-SCNC: 140 MMOL/L (ref 136–145)
TOTAL PROTEIN: 7.5 G/DL (ref 6.4–8.2)
TRIGL SERPL-MCNC: 100 MG/DL
URIC ACID: 5.2 MG/DL (ref 3.5–7.2)
VLDLC SERPL CALC-MCNC: 20 MG/DL (ref 5–35)

## 2021-06-14 DIAGNOSIS — M16.11 PRIMARY OSTEOARTHRITIS OF RIGHT HIP: ICD-10-CM

## 2021-06-14 RX ORDER — HYDROCODONE BITARTRATE AND ACETAMINOPHEN 10; 325 MG/1; MG/1
1 TABLET ORAL EVERY 6 HOURS PRN
Qty: 120 TABLET | Refills: 0 | Status: SHIPPED | OUTPATIENT
Start: 2021-06-14 | End: 2021-07-12 | Stop reason: SDUPTHER

## 2021-07-12 DIAGNOSIS — M16.11 PRIMARY OSTEOARTHRITIS OF RIGHT HIP: ICD-10-CM

## 2021-07-12 RX ORDER — HYDROCODONE BITARTRATE AND ACETAMINOPHEN 10; 325 MG/1; MG/1
1 TABLET ORAL EVERY 6 HOURS PRN
Qty: 120 TABLET | Refills: 0 | Status: SHIPPED | OUTPATIENT
Start: 2021-07-12 | End: 2021-08-11 | Stop reason: SDUPTHER

## 2021-07-13 RX ORDER — ALLOPURINOL 300 MG/1
TABLET ORAL
Qty: 90 TABLET | Refills: 0 | Status: SHIPPED | OUTPATIENT
Start: 2021-07-13 | End: 2021-08-24

## 2021-08-10 DIAGNOSIS — M16.11 PRIMARY OSTEOARTHRITIS OF RIGHT HIP: ICD-10-CM

## 2021-08-11 RX ORDER — HYDROCODONE BITARTRATE AND ACETAMINOPHEN 10; 325 MG/1; MG/1
1 TABLET ORAL EVERY 6 HOURS PRN
Qty: 120 TABLET | Refills: 0 | Status: SHIPPED | OUTPATIENT
Start: 2021-08-11 | End: 2021-11-24 | Stop reason: SDUPTHER

## 2021-08-17 ENCOUNTER — OFFICE VISIT (OUTPATIENT)
Dept: PRIMARY CARE CLINIC | Age: 76
End: 2021-08-17
Payer: MEDICARE

## 2021-08-17 VITALS
DIASTOLIC BLOOD PRESSURE: 74 MMHG | HEART RATE: 95 BPM | WEIGHT: 180 LBS | OXYGEN SATURATION: 98 % | HEIGHT: 72 IN | TEMPERATURE: 98.5 F | BODY MASS INDEX: 24.38 KG/M2 | SYSTOLIC BLOOD PRESSURE: 122 MMHG

## 2021-08-17 DIAGNOSIS — R05.9 COUGH: Primary | ICD-10-CM

## 2021-08-17 PROCEDURE — 99213 OFFICE O/P EST LOW 20 MIN: CPT | Performed by: PHYSICIAN ASSISTANT

## 2021-08-17 PROCEDURE — 4040F PNEUMOC VAC/ADMIN/RCVD: CPT | Performed by: PHYSICIAN ASSISTANT

## 2021-08-17 PROCEDURE — G8427 DOCREV CUR MEDS BY ELIG CLIN: HCPCS | Performed by: PHYSICIAN ASSISTANT

## 2021-08-17 PROCEDURE — 1036F TOBACCO NON-USER: CPT | Performed by: PHYSICIAN ASSISTANT

## 2021-08-17 PROCEDURE — G8420 CALC BMI NORM PARAMETERS: HCPCS | Performed by: PHYSICIAN ASSISTANT

## 2021-08-17 PROCEDURE — 1123F ACP DISCUSS/DSCN MKR DOCD: CPT | Performed by: PHYSICIAN ASSISTANT

## 2021-08-17 RX ORDER — AZITHROMYCIN 250 MG/1
TABLET, FILM COATED ORAL
Qty: 1 PACKET | Refills: 0 | Status: SHIPPED | OUTPATIENT
Start: 2021-08-17 | End: 2021-08-24

## 2021-08-17 ASSESSMENT — ENCOUNTER SYMPTOMS
SHORTNESS OF BREATH: 0
CHEST TIGHTNESS: 0
EYE DISCHARGE: 0
ABDOMINAL DISTENTION: 0
VOMITING: 0
PHOTOPHOBIA: 0
SORE THROAT: 0
DIARRHEA: 0
CONSTIPATION: 0
COUGH: 1
ABDOMINAL PAIN: 0
SINUS PRESSURE: 0
RHINORRHEA: 0

## 2021-08-17 NOTE — PROGRESS NOTES
[address] [place] [address] PRIMARY CARE  [address] [facility] [address]  Dept: [phone]    Kristi Orellana is a 67 y.o. male Established patient, who presents today for his medical conditions/complaints as noted below. Chief Complaint   Patient presents with    Cough     x 3 weeks productive since Saturday       HPI:     HPI: The patient is having a cough over the last 3 weeks. It is now congested. Starting to get phlegm up. No fevers. It was white. Tried OTC cough medicine to help sleep at night. Has sinuses congestion and takes pseudoephedrine and antihistamine. Does not want tested for COVID-19. Reviewed prior notes None  Reviewed previous Labs    LDL Cholesterol (mg/dL)   Date Value   2021 60   2020 69     LDL Calculated (mg/dL)   Date Value   2020 69   10/25/2017 60   2016 46       (goal LDL is <100)   AST (U/L)   Date Value   2021 45 (H)     ALT (U/L)   Date Value   2021 49     BUN (mg/dL)   Date Value   2021 17     Hemoglobin A1C (%)   Date Value   2020 5.6     BP Readings from Last 3 Encounters:   21 122/74   21 126/64   10/06/20 130/80          (goal 120/80)    Past Medical History:   Diagnosis Date    History of PSVT (paroxysmal supraventricular tachycardia) 2016    Low back pain     UTI (urinary tract infection)     Uvulitis     Vasodepressor syncope 2016    Wound, open, face       Past Surgical History:   Procedure Laterality Date    COLONOSCOPY      HIP ARTHROPLASTY Right 2019       Family History   Problem Relation Age of Onset    Cancer Mother     Diabetes Mother     Alcohol Abuse Father        Social History     Tobacco Use    Smoking status: Former Smoker     Packs/day: 2.00     Years: 15.00     Pack years: 30.00     Types: Cigarettes     Quit date: 1976     Years since quittin.6    Smokeless tobacco: Never Used   Substance Use Topics    Alcohol use:  No Alcohol/week: 0.0 standard drinks      Current Outpatient Medications   Medication Sig Dispense Refill    azithromycin (ZITHROMAX) 250 MG tablet 2 tablets now then 1 daily until gone. 1 packet 0    HYDROcodone-acetaminophen (NORCO)  MG per tablet Take 1 tablet by mouth every 6 hours as needed for Pain for up to 30 days. 120 tablet 0    allopurinol (ZYLOPRIM) 300 MG tablet Take 1 tablet by mouth once daily 90 tablet 0    sildenafil (VIAGRA) 100 MG tablet Take 100 mg by mouth as needed for Erectile Dysfunction      NONFORMULARY Take 1 capsule by mouth 2 times daily Cogniuim      diphenhydrAMINE (BENADRYL) 25 MG tablet Take 25 mg by mouth daily      senna (SENOKOT) 8.6 MG tablet Take 1 tablet by mouth daily      aspirin 81 MG tablet Take 81 mg by mouth daily       No current facility-administered medications for this visit. No Known Allergies    Health Maintenance   Topic Date Due    Hepatitis C screen  Never done    COVID-19 Vaccine (1) Never done    Shingles Vaccine (1 of 2) Never done    Flu vaccine (1) 09/01/2021    Annual Wellness Visit (AWV)  10/07/2021    Potassium monitoring  06/02/2022    Creatinine monitoring  06/02/2022    DTaP/Tdap/Td vaccine (2 - Td or Tdap) 05/06/2025    Pneumococcal 65+ years Vaccine  Completed    Hepatitis A vaccine  Aged Out    Hepatitis B vaccine  Aged Out    Hib vaccine  Aged Out    Meningococcal (ACWY) vaccine  Aged Out       Subjective:      Review of Systems   Constitutional: Negative for chills, fever and unexpected weight change. HENT: Negative for congestion, hearing loss, rhinorrhea, sinus pressure and sore throat. Eyes: Negative for photophobia, discharge and visual disturbance. Respiratory: Positive for cough. Negative for chest tightness and shortness of breath. Cardiovascular: Negative for chest pain, palpitations and leg swelling.    Gastrointestinal: Negative for abdominal distention, abdominal pain, constipation, diarrhea and vomiting. Endocrine: Negative for polydipsia and polyuria. Genitourinary: Negative for decreased urine volume, difficulty urinating, frequency and urgency. Musculoskeletal: Negative for arthralgias, gait problem and myalgias. Skin: Negative for rash. Allergic/Immunologic: Negative for food allergies. Neurological: Negative for dizziness, weakness, numbness and headaches. Hematological: Negative for adenopathy. Psychiatric/Behavioral: Negative for dysphoric mood and sleep disturbance. The patient is not nervous/anxious. Objective:     /74   Pulse 95   Temp 98.5 °F (36.9 °C) (Oral)   Ht 6' (1.829 m)   Wt 180 lb (81.6 kg)   SpO2 98%   BMI 24.41 kg/m²   Physical Exam  Constitutional:       General: He is not in acute distress. Appearance: Normal appearance. He is not ill-appearing. HENT:      Head: Normocephalic and atraumatic. Right Ear: External ear normal.      Left Ear: External ear normal.      Nose: Nose normal.      Mouth/Throat:      Mouth: Mucous membranes are moist.   Eyes:      Extraocular Movements: Extraocular movements intact. Conjunctiva/sclera: Conjunctivae normal.      Pupils: Pupils are equal, round, and reactive to light. Neck:      Vascular: No carotid bruit. Cardiovascular:      Rate and Rhythm: Normal rate and regular rhythm. Pulses: Normal pulses. Heart sounds: Normal heart sounds. Pulmonary:      Effort: Pulmonary effort is normal. No respiratory distress. Breath sounds: Normal breath sounds. Abdominal:      General: Bowel sounds are normal. There is no distension. Tenderness: There is no abdominal tenderness. Musculoskeletal:         General: Normal range of motion. Cervical back: Normal range of motion and neck supple. Lymphadenopathy:      Cervical: No cervical adenopathy. Skin:     General: Skin is warm and dry. Neurological:      General: No focal deficit present.       Mental Status: He is alert and oriented to person, place, and time. Psychiatric:         Mood and Affect: Mood normal.         Behavior: Behavior normal.         Thought Content: Thought content normal.         Assessment and Plan:          1. Cough  -     azithromycin (ZITHROMAX) 250 MG tablet; 2 tablets now then 1 daily until gone., Disp-1 packet, R-0Normal             Patient given educational materials - see patient instructions. Discussed use, benefit, and side effects of prescribed medications. All patient questions answered. Pt voiced understanding. Reviewed health maintenance. Instructed to continue current medications, diet and exercise. Patient agreed with treatment plan. Follow up as directed.      Electronically signed by LYNETTE Keating on 8/17/2021 at 9:21 AM

## 2021-08-24 ENCOUNTER — OFFICE VISIT (OUTPATIENT)
Dept: PRIMARY CARE CLINIC | Age: 76
End: 2021-08-24
Payer: MEDICARE

## 2021-08-24 VITALS
SYSTOLIC BLOOD PRESSURE: 128 MMHG | BODY MASS INDEX: 23.63 KG/M2 | WEIGHT: 174.2 LBS | OXYGEN SATURATION: 98 % | HEART RATE: 97 BPM | DIASTOLIC BLOOD PRESSURE: 88 MMHG

## 2021-08-24 DIAGNOSIS — M54.50 CHRONIC LOW BACK PAIN WITHOUT SCIATICA, UNSPECIFIED BACK PAIN LATERALITY: ICD-10-CM

## 2021-08-24 DIAGNOSIS — G89.29 CHRONIC LOW BACK PAIN WITHOUT SCIATICA, UNSPECIFIED BACK PAIN LATERALITY: ICD-10-CM

## 2021-08-24 DIAGNOSIS — Z79.899 MEDICATION MANAGEMENT: Primary | ICD-10-CM

## 2021-08-24 PROCEDURE — G8427 DOCREV CUR MEDS BY ELIG CLIN: HCPCS | Performed by: FAMILY MEDICINE

## 2021-08-24 PROCEDURE — 1036F TOBACCO NON-USER: CPT | Performed by: FAMILY MEDICINE

## 2021-08-24 PROCEDURE — 1123F ACP DISCUSS/DSCN MKR DOCD: CPT | Performed by: FAMILY MEDICINE

## 2021-08-24 PROCEDURE — 4040F PNEUMOC VAC/ADMIN/RCVD: CPT | Performed by: FAMILY MEDICINE

## 2021-08-24 PROCEDURE — G8420 CALC BMI NORM PARAMETERS: HCPCS | Performed by: FAMILY MEDICINE

## 2021-08-24 PROCEDURE — 99213 OFFICE O/P EST LOW 20 MIN: CPT | Performed by: FAMILY MEDICINE

## 2021-08-24 RX ORDER — ALLOPURINOL 300 MG/1
300 TABLET ORAL DAILY
Qty: 90 TABLET | Refills: 2 | Status: SHIPPED | OUTPATIENT
Start: 2021-08-24 | End: 2022-01-04 | Stop reason: SDUPTHER

## 2021-08-24 ASSESSMENT — PATIENT HEALTH QUESTIONNAIRE - PHQ9
SUM OF ALL RESPONSES TO PHQ QUESTIONS 1-9: 0
SUM OF ALL RESPONSES TO PHQ9 QUESTIONS 1 & 2: 0
SUM OF ALL RESPONSES TO PHQ QUESTIONS 1-9: 0
2. FEELING DOWN, DEPRESSED OR HOPELESS: 0
SUM OF ALL RESPONSES TO PHQ QUESTIONS 1-9: 0
1. LITTLE INTEREST OR PLEASURE IN DOING THINGS: 0

## 2021-08-24 ASSESSMENT — ENCOUNTER SYMPTOMS
WHEEZING: 0
COUGH: 0
SHORTNESS OF BREATH: 0
BACK PAIN: 1
DIARRHEA: 0
VOMITING: 0
NAUSEA: 0
EYE DISCHARGE: 0
EYE REDNESS: 0
RHINORRHEA: 0
SORE THROAT: 0
ABDOMINAL PAIN: 0

## 2021-08-24 NOTE — PROGRESS NOTES
[address] [facility] PRIMARY CARE  [address] [facility] [address]  Dept: [phone]    Corinne Mabry is a 67 y.o. male Established patient, who presents today for his medical conditions/complaints as noted below. Chief Complaint   Patient presents with    Medication Check       HPI:     HPI  Patient still having lower back pain. Using his hydrocodone 4 a day. States tries to decrease dose when possible. Does not want any injections. Not interested in surgery. Denies any chest pain. No shortness of breath. No lightheadedness or dizziness.     Reviewed prior notes None  Reviewed previous Labs    LDL Cholesterol (mg/dL)   Date Value   2021 60   2020 69     LDL Calculated (mg/dL)   Date Value   2020 69   10/25/2017 60   2016 46       (goal LDL is <100)   AST (U/L)   Date Value   2021 45 (H)     ALT (U/L)   Date Value   2021 49     BUN (mg/dL)   Date Value   2021 17     Hemoglobin A1C (%)   Date Value   2020 5.6     BP Readings from Last 3 Encounters:   21 128/88   21 122/74   21 126/64          (goal 120/80)    Past Medical History:   Diagnosis Date    History of PSVT (paroxysmal supraventricular tachycardia) 2016    Low back pain     UTI (urinary tract infection)     Uvulitis     Vasodepressor syncope 2016    Wound, open, face       Past Surgical History:   Procedure Laterality Date    COLONOSCOPY      HIP ARTHROPLASTY Right 2019       Family History   Problem Relation Age of Onset    Cancer Mother     Diabetes Mother     Alcohol Abuse Father        Social History     Tobacco Use    Smoking status: Former Smoker     Packs/day: 2.00     Years: 15.00     Pack years: 30.00     Types: Cigarettes     Quit date: 1976     Years since quittin.6    Smokeless tobacco: Never Used   Substance Use Topics    Alcohol use: No     Alcohol/week: 0.0 standard drinks      Current Outpatient Medications   Medication Sig Dispense Refill    allopurinol (ZYLOPRIM) 300 MG tablet Take 1 tablet by mouth daily 90 tablet 2    HYDROcodone-acetaminophen (NORCO)  MG per tablet Take 1 tablet by mouth every 6 hours as needed for Pain for up to 30 days. 120 tablet 0    sildenafil (VIAGRA) 100 MG tablet Take 100 mg by mouth as needed for Erectile Dysfunction      NONFORMULARY Take 1 capsule by mouth 2 times daily Cogniuim      diphenhydrAMINE (BENADRYL) 25 MG tablet Take 25 mg by mouth daily      senna (SENOKOT) 8.6 MG tablet Take 1 tablet by mouth daily      aspirin 81 MG tablet Take 81 mg by mouth daily       No current facility-administered medications for this visit. No Known Allergies    Health Maintenance   Topic Date Due    Hepatitis C screen  Never done    COVID-19 Vaccine (1) Never done    Shingles Vaccine (1 of 2) Never done    Flu vaccine (1) 09/01/2021    Annual Wellness Visit (AWV)  10/07/2021    Potassium monitoring  06/02/2022    Creatinine monitoring  06/02/2022    DTaP/Tdap/Td vaccine (2 - Td or Tdap) 05/06/2025    Pneumococcal 65+ years Vaccine  Completed    Hepatitis A vaccine  Aged Out    Hepatitis B vaccine  Aged Out    Hib vaccine  Aged Out    Meningococcal (ACWY) vaccine  Aged Out       Subjective:      Review of Systems   Constitutional: Negative for chills and fever. HENT: Negative for rhinorrhea and sore throat. Eyes: Negative for discharge and redness. Respiratory: Negative for cough, shortness of breath and wheezing. Cardiovascular: Negative for chest pain and palpitations. Gastrointestinal: Negative for abdominal pain, diarrhea, nausea and vomiting. Genitourinary: Negative for dysuria and frequency. Musculoskeletal: Positive for back pain. Negative for arthralgias and myalgias. Neurological: Negative for dizziness, light-headedness and headaches. Psychiatric/Behavioral: Negative for sleep disturbance.        Objective:     BP 128/88   Pulse 97   Wt 174 lb 3.2 oz (79 kg)   SpO2 98%   BMI 23.63 kg/m²   Physical Exam  Vitals and nursing note reviewed. Constitutional:       General: He is not in acute distress. Appearance: He is well-developed. He is not ill-appearing. HENT:      Head: Normocephalic and atraumatic. Right Ear: External ear normal.      Left Ear: External ear normal.   Eyes:      General: No scleral icterus. Right eye: No discharge. Left eye: No discharge. Conjunctiva/sclera: Conjunctivae normal.      Pupils: Pupils are equal, round, and reactive to light. Neck:      Thyroid: No thyromegaly. Trachea: No tracheal deviation. Cardiovascular:      Rate and Rhythm: Normal rate and regular rhythm. Heart sounds: Normal heart sounds. Pulmonary:      Effort: Pulmonary effort is normal. No respiratory distress. Breath sounds: Normal breath sounds. No wheezing. Lymphadenopathy:      Cervical: No cervical adenopathy. Skin:     General: Skin is warm. Findings: No rash. Neurological:      Mental Status: He is alert and oriented to person, place, and time. Psychiatric:         Mood and Affect: Mood normal.         Behavior: Behavior normal.         Thought Content: Thought content normal.       Controlled Substance Monitoring:    Acute and Chronic Pain Monitoring:   RX Monitoring 8/24/2021   Attestation -   Acute Pain Prescriptions -   Periodic Controlled Substance Monitoring No signs of potential drug abuse or diversion identified. ;Possible medication side effects, risk of tolerance/dependence & alternative treatments discussed. Chronic Pain > 50 MEDD Obtained or confirmed \"Consent for Opioid Use\" on file. Chronic Pain > 80 MEDD -   Chronic Pain > 120 MEDD Obtained or confirmed \"Medication Contract\" on file. Assessment:       Diagnosis Orders   1. Medication management     2.  Chronic low back pain without sciatica, unspecified back pain laterality Plan:    pt ed Shingrix  Renew medication  Covid vaccine discussed    Return in about 6 months (around 2/24/2022). No orders of the defined types were placed in this encounter. Orders Placed This Encounter   Medications    allopurinol (ZYLOPRIM) 300 MG tablet     Sig: Take 1 tablet by mouth daily     Dispense:  90 tablet     Refill:  2       Patient given educational materials - see patient instructions. Discussed use, benefit, and side effects of prescribed medications. All patient questions answered. Pt voiced understanding. Reviewed health maintenance. Instructed to continue current medications, diet andexercise. Patient agreed with treatment plan. Follow up as directed.      Electronicallysigned by Jose Antonio Banegas MD on 8/24/2021 at 9:42 AM

## 2021-09-14 DIAGNOSIS — M16.11 PRIMARY OSTEOARTHRITIS OF RIGHT HIP: Primary | ICD-10-CM

## 2021-09-14 RX ORDER — HYDROCODONE BITARTRATE AND ACETAMINOPHEN 10; 325 MG/1; MG/1
1 TABLET ORAL EVERY 6 HOURS PRN
Qty: 120 TABLET | Refills: 0 | Status: SHIPPED | OUTPATIENT
Start: 2021-09-14 | End: 2021-10-20 | Stop reason: SDUPTHER

## 2021-10-20 DIAGNOSIS — M16.11 PRIMARY OSTEOARTHRITIS OF RIGHT HIP: ICD-10-CM

## 2021-10-20 RX ORDER — HYDROCODONE BITARTRATE AND ACETAMINOPHEN 10; 325 MG/1; MG/1
1 TABLET ORAL EVERY 6 HOURS PRN
Qty: 120 TABLET | Refills: 0 | Status: SHIPPED | OUTPATIENT
Start: 2021-10-20 | End: 2021-11-19

## 2021-10-20 NOTE — TELEPHONE ENCOUNTER
----- Message from Nellie Baca sent at 10/19/2021  4:58 PM EDT -----  Subject: Refill Request    QUESTIONS  Name of Medication? HYDROcodone-acetaminophen (NORCO)  MG per tablet  Patient-reported dosage and instructions? 325mg  How many days do you have left? 0  Preferred Pharmacy? [facility]  Pharmacy phone number (if available)? [phone]  Additional Information for Provider? Patient is out of meds  ---------------------------------------------------------------------------  --------------  CALL BACK INFO  What is the best way for the office to contact you? OK to leave message on   voicemail  Preferred Call Back Phone Number?  [phone]

## 2021-11-24 DIAGNOSIS — M16.11 PRIMARY OSTEOARTHRITIS OF RIGHT HIP: ICD-10-CM

## 2021-11-24 RX ORDER — HYDROCODONE BITARTRATE AND ACETAMINOPHEN 10; 325 MG/1; MG/1
1 TABLET ORAL EVERY 6 HOURS PRN
Qty: 120 TABLET | Refills: 0 | Status: SHIPPED | OUTPATIENT
Start: 2021-11-24 | End: 2022-01-03 | Stop reason: SDUPTHER

## 2021-12-22 ENCOUNTER — TELEPHONE (OUTPATIENT)
Dept: PRIMARY CARE CLINIC | Age: 76
End: 2021-12-22

## 2021-12-22 DIAGNOSIS — U07.1 COVID: ICD-10-CM

## 2021-12-22 RX ORDER — SODIUM CHLORIDE 9 MG/ML
INJECTION, SOLUTION INTRAVENOUS CONTINUOUS
OUTPATIENT
Start: 2021-12-28

## 2021-12-22 NOTE — TELEPHONE ENCOUNTER
This is a patient of Dr. Stallworth we just scanned in his COVID result wants to get the AB infusion. Just need to ok to order.

## 2022-01-03 DIAGNOSIS — M16.11 PRIMARY OSTEOARTHRITIS OF RIGHT HIP: ICD-10-CM

## 2022-01-03 RX ORDER — HYDROCODONE BITARTRATE AND ACETAMINOPHEN 10; 325 MG/1; MG/1
1 TABLET ORAL EVERY 6 HOURS PRN
Qty: 120 TABLET | Refills: 0 | Status: SHIPPED | OUTPATIENT
Start: 2022-01-03 | End: 2022-01-31 | Stop reason: SDUPTHER

## 2022-01-04 ENCOUNTER — TELEPHONE (OUTPATIENT)
Dept: PRIMARY CARE CLINIC | Age: 77
End: 2022-01-04

## 2022-01-04 RX ORDER — ALLOPURINOL 300 MG/1
300 TABLET ORAL DAILY
Qty: 90 TABLET | Refills: 2 | Status: SHIPPED | OUTPATIENT
Start: 2022-01-04 | End: 2022-09-02

## 2022-01-04 NOTE — TELEPHONE ENCOUNTER
[facility] Needs appt. Frank Quiroga (Pt's wife) asking that you call her. Has some concerns re his BS meds and would like to discuss with Dr. Kelsie Ayala. I L.M. letting her know that we have openings on Mon the 10th, but may want him in sooner.

## 2022-01-05 ENCOUNTER — TELEPHONE (OUTPATIENT)
Dept: PRIMARY CARE CLINIC | Age: 77
End: 2022-01-05

## 2022-01-05 NOTE — TELEPHONE ENCOUNTER
[facility] Initial Follow Up Call    Outreach made within 2 business days of discharge: Yes    Patient: Olamide Pritchard Patient : 1945   MRN: [id number]  Reason for Admission: No discharge information exists for this patient. Discharge Date:         Spoke with: Iva Trianas (Wife)    Discharge department/facility: [facility]    TCM Interactive Patient Contact:  Was patient able to fill all prescriptions: Yes (1 needs prior authorization per wife.)  Was patient instructed to bring all medications to the follow-up visit: Yes  Is patient taking all medications as directed in the discharge summary? No ( wife states he did not take any medications on 22.)  Does patient understand their discharge instructions: Yes  Does patient have questions or concerns that need addressed prior to 7-14 day follow up office visit: no    Scheduled appointment with PCP within 7-14 days    Follow Up  No future appointments.     Dimas Holm

## 2022-01-07 ENCOUNTER — OFFICE VISIT (OUTPATIENT)
Dept: PRIMARY CARE CLINIC | Age: 77
End: 2022-01-07
Payer: MEDICARE

## 2022-01-07 VITALS
DIASTOLIC BLOOD PRESSURE: 60 MMHG | OXYGEN SATURATION: 94 % | SYSTOLIC BLOOD PRESSURE: 108 MMHG | BODY MASS INDEX: 21.85 KG/M2 | HEART RATE: 68 BPM | WEIGHT: 161.3 LBS | HEIGHT: 72 IN

## 2022-01-07 DIAGNOSIS — T50.905A HYPERGLYCEMIA, DRUG-INDUCED: ICD-10-CM

## 2022-01-07 DIAGNOSIS — R53.1 WEAKNESS: ICD-10-CM

## 2022-01-07 DIAGNOSIS — I49.3 VENTRICULAR ECTOPY: ICD-10-CM

## 2022-01-07 DIAGNOSIS — I47.1 PSVT (PAROXYSMAL SUPRAVENTRICULAR TACHYCARDIA) (HCC): ICD-10-CM

## 2022-01-07 DIAGNOSIS — I77.1 ARTERIAL INSUFFICIENCY (HCC): ICD-10-CM

## 2022-01-07 DIAGNOSIS — U07.1 COVID-19: Primary | ICD-10-CM

## 2022-01-07 DIAGNOSIS — R73.9 HYPERGLYCEMIA, DRUG-INDUCED: ICD-10-CM

## 2022-01-07 PROCEDURE — 82962 GLUCOSE BLOOD TEST: CPT | Performed by: FAMILY MEDICINE

## 2022-01-07 PROCEDURE — 99496 TRANSJ CARE MGMT HIGH F2F 7D: CPT | Performed by: FAMILY MEDICINE

## 2022-01-07 PROCEDURE — 1111F DSCHRG MED/CURRENT MED MERGE: CPT | Performed by: FAMILY MEDICINE

## 2022-01-07 PROCEDURE — 93000 ELECTROCARDIOGRAM COMPLETE: CPT | Performed by: FAMILY MEDICINE

## 2022-01-07 RX ORDER — METOPROLOL SUCCINATE 25 MG/1
12.5 TABLET, EXTENDED RELEASE ORAL DAILY
Qty: 45 TABLET | Refills: 3 | Status: SHIPPED | OUTPATIENT
Start: 2022-01-07 | End: 2022-04-27

## 2022-01-07 RX ORDER — BENZONATATE 100 MG/1
CAPSULE ORAL
COMMUNITY
Start: 2022-01-04 | End: 2022-04-13

## 2022-01-07 RX ORDER — METOPROLOL TARTRATE 50 MG/1
50 TABLET, FILM COATED ORAL 2 TIMES DAILY
COMMUNITY
Start: 2022-01-04 | End: 2022-04-13

## 2022-01-07 ASSESSMENT — ENCOUNTER SYMPTOMS
SHORTNESS OF BREATH: 1
EYE DISCHARGE: 0
ABDOMINAL PAIN: 0
SORE THROAT: 0
COUGH: 0
WHEEZING: 0
RHINORRHEA: 0
EYE REDNESS: 0
NAUSEA: 0
VOMITING: 0
DIARRHEA: 0

## 2022-01-07 NOTE — PROGRESS NOTES
Post-Discharge Transitional Care Management Services or Hospital Follow Up      Lia العراقي   YOB: 1945    Date of Office Visit:  1/7/2022  Date of Hospital Admission:    Date of Hospital Discharge:    Readmission Risk Score(high >=14%. Medium >=10%):No data recorded    Care management risk score Rising risk (score 2-5) and Complex Care (Scores >=6): 1     Non face to face  following discharge, date last encounter closed (first attempt may have been earlier): 1/5/2022  9:35 AM 1/5/2022  9:35 AM    Call initiated 2 business days of discharge: Yes     Patient Active Problem List   Diagnosis    Arterial insufficiency ([address]    Autonomic nervous system disorder    Diverticulosis    Fatty liver    Gout    Lower back pain    Male erectile disorder    Polyarthritis    Raynaud's disease    Anxiety    Vasodepressor syncope    History of PSVT (paroxysmal supraventricular tachycardia)    Gastroesophageal reflux disease without esophagitis    Essential (primary) hypertension    PSVT (paroxysmal supraventricular tachycardia) ([address]    COVID       No Known Allergies    Medications listed as ordered at the time of discharge from hospital     Medication List          Accurate as of January 7, 2022 12:19 PM. If you have any questions, ask your nurse or doctor. START taking these medications    metoprolol succinate 25 MG extended release tablet  Commonly known as: TOPROL XL  Take 0.5 tablets by mouth daily  Started by: Ygnacio Schwab, MD        CONTINUE taking these medications    allopurinol 300 MG tablet  Commonly known as: ZYLOPRIM  Take 1 tablet by mouth daily     aspirin 81 MG tablet     benzonatate 100 MG capsule  Commonly known as: TESSALON     diphenhydrAMINE 25 MG tablet  Commonly known as: BENADRYL     HYDROcodone-acetaminophen  MG per tablet  Commonly known as: NORCO  Take 1 tablet by mouth every 6 hours as needed for Pain for up to 30 days.      metFORMIN 500 MG tablet  Commonly known as: GLUCOPHAGE     metoprolol tartrate 50 MG tablet  Commonly known as: LOPRESSOR     NONFORMULARY     senna 8.6 MG tablet  Commonly known as: SENOKOT     sildenafil 100 MG tablet  Commonly known as: VIAGRA           Where to Get Your Medications      These medications were sent to [address], [address] [address] [address]    Phone: [phone]   · metoprolol succinate 25 MG extended release tablet           Medications marked \"taking\" at this time  Outpatient Medications Marked as Taking for the 1/7/22 encounter (Office Visit) with Dionne Ng MD   Medication Sig Dispense Refill    metoprolol succinate (TOPROL XL) 25 MG extended release tablet Take 0.5 tablets by mouth daily 45 tablet 3    allopurinol (ZYLOPRIM) 300 MG tablet Take 1 tablet by mouth daily 90 tablet 2    HYDROcodone-acetaminophen (NORCO)  MG per tablet Take 1 tablet by mouth every 6 hours as needed for Pain for up to 30 days. 120 tablet 0    sildenafil (VIAGRA) 100 MG tablet Take 100 mg by mouth as needed for Erectile Dysfunction      NONFORMULARY Take 1 capsule by mouth 2 times daily Cogniuim      diphenhydrAMINE (BENADRYL) 25 MG tablet Take 25 mg by mouth daily      senna (SENOKOT) 8.6 MG tablet Take 1 tablet by mouth daily      aspirin 81 MG tablet Take 81 mg by mouth daily          Medications patient taking as of now reconciled against medications ordered at time of hospital discharge: Yes    Chief Complaint   Patient presents with    Follow-Up from [address] [address]       HPI    Inpatient course: Discharge summary reviewed- see chart. Interval history/Current status: Patient had hospitalization at [place] [address]. Patient is a poor historian. Upon review of records, looks like he tested positive for COVID and was admitted for marked weakness.   Discharge summary or other testing at [facility] is not available. Patient was placed on metoprolol, metformin. Was also given Decadron for 2 days. Unsure if he had received antiviral medication. Patient still getting short of breath easily. Still complaining of marked weakness. Denies any chest heaviness. No loss of sense of taste or smell. Review of Systems   Constitutional: Negative for chills and fever. HENT: Negative for rhinorrhea and sore throat. Eyes: Negative for discharge and redness. Respiratory: Positive for shortness of breath. Negative for cough and wheezing. Cardiovascular: Negative for chest pain and palpitations. Gastrointestinal: Negative for abdominal pain, diarrhea, nausea and vomiting. Genitourinary: Negative for dysuria and frequency. Musculoskeletal: Negative for arthralgias and myalgias. Neurological: Positive for weakness. Negative for dizziness, light-headedness and headaches. Psychiatric/Behavioral: Negative for sleep disturbance. Vitals:    01/07/22 1137   BP: 108/60   Pulse: 68   SpO2: 94%   Weight: 161 lb 4.8 oz (73.2 kg)   Height: 6' (1.829 m)     Body mass index is 21.88 kg/m². Wt Readings from Last 3 Encounters:   01/07/22 161 lb 4.8 oz (73.2 kg)   08/24/21 174 lb 3.2 oz (79 kg)   08/17/21 180 lb (81.6 kg)     BP Readings from Last 3 Encounters:   01/07/22 108/60   08/24/21 128/88   08/17/21 122/74       Physical Exam  Vitals and nursing note reviewed. Constitutional:       General: He is not in acute distress. Appearance: He is well-developed. He is not ill-appearing. HENT:      Head: Normocephalic and atraumatic. Right Ear: External ear normal.      Left Ear: External ear normal.   Eyes:      General: No scleral icterus. Right eye: No discharge. Left eye: No discharge. Conjunctiva/sclera: Conjunctivae normal.      Pupils: Pupils are equal, round, and reactive to light. Neck:      Thyroid: No thyromegaly. Trachea: No tracheal deviation. Cardiovascular:      Rate and Rhythm: Normal rate. Rhythm irregular. Heart sounds: Normal heart sounds. Pulmonary:      Effort: Pulmonary effort is normal. No respiratory distress. Breath sounds: Normal breath sounds. No wheezing. Lymphadenopathy:      Cervical: No cervical adenopathy. Skin:     General: Skin is warm. Findings: No rash. Neurological:      Mental Status: He is alert and oriented to person, place, and time. Psychiatric:         Mood and Affect: Mood normal.         Behavior: Behavior normal.         Thought Content: Thought content normal.       Patient advised he is not diabetic and does not need to start the metformin. EKG was done, showing him to be in trigeminy or quadrigeminy. Recommended starting metoprolol XR 25 mg at 1/2 tablet daily. Patient is to get Holter monitor done in 1 week  Reassess in 4 weeks. We will see how his ventricular ectopy and strength is going. Will attempt to get discharge summary from hospital  Continue with Lorcet for pain        Assessment/Plan:  1. Arterial insufficiency (HCC)  Stable    2. PSVT (paroxysmal supraventricular tachycardia) ([facility])  Stable  - EKG 12 lead; Future    3. COVID-19  Improved  - CT DISCHARGE MEDS RECONCILED W/ CURRENT OUTPATIENT MED LIST    4. Weakness  Continue with therapy  - CT DISCHARGE MEDS RECONCILED W/ CURRENT OUTPATIENT MED LIST    5.  Hyperglycemia, drug-induced  Blood sugar normal  - POCT Glucose        Medical Decision Making: high complexity

## 2022-01-10 ENCOUNTER — TELEPHONE (OUTPATIENT)
Dept: PRIMARY CARE CLINIC | Age: 77
End: 2022-01-10

## 2022-01-10 NOTE — TELEPHONE ENCOUNTER
Dat  with Dani calling, states he was reviewing pt's meds and pt told him that he was not taking eliquis and would not unless PCP told him to specifically. Notes from [address] [place] [address] now scanned in under media. Please advise.        Danny  [phone]

## 2022-01-11 ENCOUNTER — OFFICE VISIT (OUTPATIENT)
Dept: PRIMARY CARE CLINIC | Age: 77
End: 2022-01-11
Payer: MEDICARE

## 2022-01-11 ENCOUNTER — TELEPHONE (OUTPATIENT)
Dept: PRIMARY CARE CLINIC | Age: 77
End: 2022-01-11

## 2022-01-11 VITALS — WEIGHT: 162.6 LBS | DIASTOLIC BLOOD PRESSURE: 62 MMHG | SYSTOLIC BLOOD PRESSURE: 118 MMHG | BODY MASS INDEX: 22.05 KG/M2

## 2022-01-11 DIAGNOSIS — K13.70 ORAL MUCOSAL LESION: ICD-10-CM

## 2022-01-11 DIAGNOSIS — K13.70 ORAL MUCOSAL LESION: Primary | ICD-10-CM

## 2022-01-11 PROCEDURE — 1123F ACP DISCUSS/DSCN MKR DOCD: CPT | Performed by: NURSE PRACTITIONER

## 2022-01-11 PROCEDURE — 99213 OFFICE O/P EST LOW 20 MIN: CPT | Performed by: NURSE PRACTITIONER

## 2022-01-11 PROCEDURE — G8484 FLU IMMUNIZE NO ADMIN: HCPCS | Performed by: NURSE PRACTITIONER

## 2022-01-11 PROCEDURE — G8420 CALC BMI NORM PARAMETERS: HCPCS | Performed by: NURSE PRACTITIONER

## 2022-01-11 PROCEDURE — 4040F PNEUMOC VAC/ADMIN/RCVD: CPT | Performed by: NURSE PRACTITIONER

## 2022-01-11 PROCEDURE — G8427 DOCREV CUR MEDS BY ELIG CLIN: HCPCS | Performed by: NURSE PRACTITIONER

## 2022-01-11 PROCEDURE — 1036F TOBACCO NON-USER: CPT | Performed by: NURSE PRACTITIONER

## 2022-01-11 ASSESSMENT — ENCOUNTER SYMPTOMS
COUGH: 0
NAUSEA: 0
DIARRHEA: 0
SINUS PRESSURE: 0
SINUS PAIN: 0
SHORTNESS OF BREATH: 0

## 2022-01-11 NOTE — PROGRESS NOTES
[address] [facility] PRIMARY CARE  [address]  [facility] [address]  Dept: [phone]    Laurie Robles is a 67 y.o. male Established patient, who presents today for his medical conditions/complaints as noted below. Chief Complaint   Patient presents with    Rash     pt has blisters on the inside of his mouth - patient was in hospital last week with COVID       HPI:     HPI  He noticed blisters in his mouth this morning. Yesterday his mouth was uncomfortable. He just did not feel right. He was recently hospitalized for Covid. He states he did start some new medication - the metformin and metoprolol are new. He has always taken hydrocodone and allopurinol.      Reviewed prior notes Previous PCP  Reviewed previous Hospital Records    LDL Cholesterol (mg/dL)   Date Value   06/02/2021 60   02/27/2020 69     LDL Calculated (mg/dL)   Date Value   02/27/2020 69   10/25/2017 60   08/05/2016 46       (goal LDL is <100)   AST (U/L)   Date Value   06/02/2021 45 (H)     ALT (U/L)   Date Value   06/02/2021 49     BUN (mg/dL)   Date Value   06/02/2021 17     Hemoglobin A1C (%)   Date Value   03/16/2020 5.6     BP Readings from Last 3 Encounters:   01/11/22 118/62   01/07/22 108/60   08/24/21 128/88          (goal 120/80)    Past Medical History:   Diagnosis Date    COVID 12/22/2021    History of PSVT (paroxysmal supraventricular tachycardia) 5/6/2016    Low back pain     UTI (urinary tract infection)     Uvulitis     Vasodepressor syncope 5/6/2016    Wound, open, face       Past Surgical History:   Procedure Laterality Date    COLONOSCOPY      HIP ARTHROPLASTY Right 01/07/2019       Family History   Problem Relation Age of Onset    Cancer Mother     Diabetes Mother     Alcohol Abuse Father        Social History     Tobacco Use    Smoking status: Former Smoker     Packs/day: 2.00     Years: 15.00     Pack years: 30.00     Types: Cigarettes     Quit date: 1/1/1976 Years since quittin.0    Smokeless tobacco: Never Used   Substance Use Topics    Alcohol use: No     Alcohol/week: 0.0 standard drinks      Current Outpatient Medications   Medication Sig Dispense Refill    Magic Mouthwash (MIRACLE MOUTHWASH) Swish and spit 5 mLs 4 times daily for 14 days Equal parts viscous lidocaine 2%, Mylanta, diphenhydramine 12.5 mg per 5 ml elixir, Nystatin 100,000 suspension, prednisolone 15 mg per 5 ml 280 mL 1    metFORMIN (GLUCOPHAGE) 500 MG tablet Take 500 mg by mouth 2 times daily      metoprolol tartrate (LOPRESSOR) 50 MG tablet Take 50 mg by mouth 2 times daily 1/2 tablet twice daily      benzonatate (TESSALON) 100 MG capsule TAKE 2 CAPSULES BY MOUTH THREE TIMES DAILY FOR 5 DAYS AS NEEDED FOR COUGH      metoprolol succinate (TOPROL XL) 25 MG extended release tablet Take 0.5 tablets by mouth daily 45 tablet 3    allopurinol (ZYLOPRIM) 300 MG tablet Take 1 tablet by mouth daily 90 tablet 2    HYDROcodone-acetaminophen (NORCO)  MG per tablet Take 1 tablet by mouth every 6 hours as needed for Pain for up to 30 days. 120 tablet 0    sildenafil (VIAGRA) 100 MG tablet Take 100 mg by mouth as needed for Erectile Dysfunction      NONFORMULARY Take 1 capsule by mouth 2 times daily Cogniuim      diphenhydrAMINE (BENADRYL) 25 MG tablet Take 25 mg by mouth daily      senna (SENOKOT) 8.6 MG tablet Take 1 tablet by mouth daily      aspirin 81 MG tablet Take 81 mg by mouth daily       No current facility-administered medications for this visit.      No Known Allergies    Health Maintenance   Topic Date Due    Hepatitis C screen  Never done    COVID-19 Vaccine (1) Never done    Shingles Vaccine (1 of 2) Never done    Flu vaccine (1) 2021    Annual Wellness Visit (AWV)  10/07/2021    Potassium monitoring  2022    Creatinine monitoring  2022    Depression Screen  2022    DTaP/Tdap/Td vaccine (2 - Td or Tdap) 2025    Pneumococcal 65+ years Vaccine  Completed    Hepatitis A vaccine  Aged Out    Hepatitis B vaccine  Aged Out    Hib vaccine  Aged Out    Meningococcal (ACWY) vaccine  Aged Out       Subjective:      Review of Systems   Constitutional: Positive for fatigue. Negative for chills and fever. HENT: Positive for mouth sores. Negative for sinus pressure and sinus pain. Respiratory: Negative for cough and shortness of breath. Cardiovascular: Negative for chest pain and leg swelling. Gastrointestinal: Negative for diarrhea and nausea. Skin: Negative for rash and wound. Psychiatric/Behavioral: Negative for sleep disturbance. The patient is nervous/anxious. Objective:     /62   Wt 162 lb 9.6 oz (73.8 kg)   BMI 22.05 kg/m²   Physical Exam  Vitals and nursing note reviewed. Constitutional:       Appearance: Normal appearance. HENT:      Head: Normocephalic and atraumatic. Right Ear: External ear normal.      Left Ear: External ear normal.      Mouth/Throat: Tonsils: No tonsillar exudate or tonsillar abscesses. Comments: Multiple open white lesions on tongue an lower lip  Cardiovascular:      Rate and Rhythm: Normal rate and regular rhythm. Heart sounds: Normal heart sounds. Pulmonary:      Effort: Pulmonary effort is normal.      Breath sounds: Normal breath sounds. Abdominal:      General: Bowel sounds are normal.      Palpations: Abdomen is soft. Neurological:      Mental Status: He is alert. Psychiatric:         Mood and Affect: Mood is anxious. Speech: Speech normal.         Behavior: Behavior normal.         Assessment/Plan:   1. Oral mucosal lesion  -     Magic Mouthwash (MIRACLE MOUTHWASH);  Swish and spit 5 mLs 4 times daily for 14 days Equal parts viscous lidocaine 2%, Mylanta, diphenhydramine 12.5 mg per 5 ml elixir, Nystatin 100,000 suspension, prednisolone 15 mg per 5 ml, Disp-280 mL, R-1NO PRINT     Magic mouthwash 4x/day    Return if symptoms worsen or fail to improve. No orders of the defined types were placed in this encounter. Orders Placed This Encounter   Medications    Magic Mouthwash (MIRACLE MOUTHWASH)     Sig: Swish and spit 5 mLs 4 times daily for 14 days Equal parts viscous lidocaine 2%, Mylanta, diphenhydramine 12.5 mg per 5 ml elixir, Nystatin 100,000 suspension, prednisolone 15 mg per 5 ml     Dispense:  280 mL     Refill:  1       Patient given educational materials - see patient instructions. Discussed use, benefit, and side effects of prescribed medications. All patient questions answered. Pt voiced understanding. Reviewed health maintenance. Instructed to continue current medications, diet and exercise. Patient agreed with treatment plan. Follow up as directed.      Electronically signed by DIMITRI Pinon CNP on 1/11/2022 at 12:30 PM

## 2022-01-14 ENCOUNTER — TELEPHONE (OUTPATIENT)
Dept: PRIMARY CARE CLINIC | Age: 77
End: 2022-01-14

## 2022-01-14 NOTE — TELEPHONE ENCOUNTER
Did you get a fax from [facility] on this pt? A discharge summary. Pt is supposed to be on a blood thinner, per spouse and they have not received any meds yet. A request was faxed to get D/C summary on the 12th. Scanned in under media.

## 2022-01-14 NOTE — TELEPHONE ENCOUNTER
PT'S PHARMACY CALLED STATING PT PRESENTED WITH A ELIQUIS RX AND THAT HIS INSURANCE WOULD NOT COVER IT. I CONTACTED PT TO GIVE HIM A SAMPLE BUT HE IS RELUCTANT TO TAKE ANY MEDS AND DOES NOT WANT TO TAKE ANYTHING THAT ISN'T NEEDED. PLEASE REVIEW DISCHARGE NOTES AND ADVISE ON IF HE SHOULD BE TAKING ELIQUIS.

## 2022-01-20 ENCOUNTER — OFFICE VISIT (OUTPATIENT)
Dept: PRIMARY CARE CLINIC | Age: 77
End: 2022-01-20
Payer: MEDICARE

## 2022-01-20 VITALS
SYSTOLIC BLOOD PRESSURE: 112 MMHG | DIASTOLIC BLOOD PRESSURE: 72 MMHG | BODY MASS INDEX: 21.75 KG/M2 | WEIGHT: 160.4 LBS | HEART RATE: 91 BPM | OXYGEN SATURATION: 96 %

## 2022-01-20 DIAGNOSIS — U09.9 POST-COVID-19 CONDITION: ICD-10-CM

## 2022-01-20 DIAGNOSIS — J32.9 CHRONIC CONGESTION OF PARANASAL SINUS: Primary | ICD-10-CM

## 2022-01-20 PROCEDURE — G8420 CALC BMI NORM PARAMETERS: HCPCS | Performed by: NURSE PRACTITIONER

## 2022-01-20 PROCEDURE — 4040F PNEUMOC VAC/ADMIN/RCVD: CPT | Performed by: NURSE PRACTITIONER

## 2022-01-20 PROCEDURE — 99213 OFFICE O/P EST LOW 20 MIN: CPT | Performed by: NURSE PRACTITIONER

## 2022-01-20 PROCEDURE — 1036F TOBACCO NON-USER: CPT | Performed by: NURSE PRACTITIONER

## 2022-01-20 PROCEDURE — 1123F ACP DISCUSS/DSCN MKR DOCD: CPT | Performed by: NURSE PRACTITIONER

## 2022-01-20 PROCEDURE — G8484 FLU IMMUNIZE NO ADMIN: HCPCS | Performed by: NURSE PRACTITIONER

## 2022-01-20 PROCEDURE — G8427 DOCREV CUR MEDS BY ELIG CLIN: HCPCS | Performed by: NURSE PRACTITIONER

## 2022-01-20 ASSESSMENT — ENCOUNTER SYMPTOMS
ABDOMINAL PAIN: 0
RHINORRHEA: 1
EYE REDNESS: 0
EYE DISCHARGE: 0
SORE THROAT: 0
VOMITING: 0
COUGH: 0
SHORTNESS OF BREATH: 0
NAUSEA: 0
WHEEZING: 0
DIARRHEA: 0

## 2022-01-20 NOTE — PROGRESS NOTES
[address] [facility] PRIMARY CARE  [address] [facility] [address]  Dept: [phone]    Carolynn Oscar is a 67 y.o. male Established patient, who presents today for his medical conditions/complaints as noted below. Chief Complaint   Patient presents with    Headache     left side of head - effecting eye movement in left eye - patient does state the pain is better today. HPI:     HPI  Lips still tingling - but improving  Eating is better. Left sided head pain -  Today is the first day it has  gotten better. He is still having some pain but not like it was. NO vision problems  Sinus congestion is normal. He always has sinus congestion. Ear is not effected.     Reviewed prior notes None  Reviewed previous      LDL Cholesterol (mg/dL)   Date Value   06/02/2021 60   02/27/2020 69     LDL Calculated (mg/dL)   Date Value   02/27/2020 69   10/25/2017 60   08/05/2016 46       (goal LDL is <100)   AST (U/L)   Date Value   06/02/2021 45 (H)     ALT (U/L)   Date Value   06/02/2021 49     BUN (mg/dL)   Date Value   06/02/2021 17     Hemoglobin A1C (%)   Date Value   03/16/2020 5.6     BP Readings from Last 3 Encounters:   01/20/22 112/72   01/11/22 118/62   01/07/22 108/60          (goal 120/80)    Past Medical History:   Diagnosis Date    COVID 12/22/2021    History of PSVT (paroxysmal supraventricular tachycardia) 5/6/2016    Low back pain     UTI (urinary tract infection)     Uvulitis     Vasodepressor syncope 5/6/2016    Wound, open, face       Past Surgical History:   Procedure Laterality Date    COLONOSCOPY      HIP ARTHROPLASTY Right 01/07/2019       Family History   Problem Relation Age of Onset    Cancer Mother     Diabetes Mother     Alcohol Abuse Father        Social History     Tobacco Use    Smoking status: Former Smoker     Packs/day: 2.00     Years: 15.00     Pack years: 30.00     Types: Cigarettes     Quit date: 1/1/1976     Years since quittin.0    Smokeless tobacco: Never Used   Substance Use Topics    Alcohol use: No     Alcohol/week: 0.0 standard drinks      Current Outpatient Medications   Medication Sig Dispense Refill    Magic Mouthwash (MIRACLE MOUTHWASH) Swish and spit 5 mLs 4 times daily for 14 days Equal parts viscous lidocaine 2%, Mylanta, diphenhydramine 12.5 mg per 5 ml elixir, Nystatin 100,000 suspension, prednisolone 15 mg per 5 ml 280 mL 1    metFORMIN (GLUCOPHAGE) 500 MG tablet Take 500 mg by mouth 2 times daily      metoprolol tartrate (LOPRESSOR) 50 MG tablet Take 50 mg by mouth 2 times daily 1/2 tablet twice daily      benzonatate (TESSALON) 100 MG capsule TAKE 2 CAPSULES BY MOUTH THREE TIMES DAILY FOR 5 DAYS AS NEEDED FOR COUGH      metoprolol succinate (TOPROL XL) 25 MG extended release tablet Take 0.5 tablets by mouth daily 45 tablet 3    allopurinol (ZYLOPRIM) 300 MG tablet Take 1 tablet by mouth daily 90 tablet 2    HYDROcodone-acetaminophen (NORCO)  MG per tablet Take 1 tablet by mouth every 6 hours as needed for Pain for up to 30 days. 120 tablet 0    sildenafil (VIAGRA) 100 MG tablet Take 100 mg by mouth as needed for Erectile Dysfunction      NONFORMULARY Take 1 capsule by mouth 2 times daily Cogniuim      diphenhydrAMINE (BENADRYL) 25 MG tablet Take 25 mg by mouth daily      senna (SENOKOT) 8.6 MG tablet Take 1 tablet by mouth daily      aspirin 81 MG tablet Take 81 mg by mouth daily       No current facility-administered medications for this visit.      No Known Allergies    Health Maintenance   Topic Date Due    Hepatitis C screen  Never done    COVID-19 Vaccine (1) Never done    Shingles Vaccine (1 of 2) Never done    Flu vaccine (1) 2021    Annual Wellness Visit (AWV)  10/07/2021    Potassium monitoring  2022    Creatinine monitoring  2022    Depression Screen  2022    DTaP/Tdap/Td vaccine (2 - Td or Tdap) 2025    Pneumococcal 65+ years Vaccine Completed    Hepatitis A vaccine  Aged Out    Hepatitis B vaccine  Aged Out    Hib vaccine  Aged Out    Meningococcal (ACWY) vaccine  Aged Out       Subjective:      Review of Systems   Constitutional: Negative for chills and fever. HENT: Positive for congestion, mouth sores and rhinorrhea. Negative for sore throat. Eyes: Negative for discharge and redness. Respiratory: Negative for cough, shortness of breath and wheezing. Cardiovascular: Negative for chest pain and palpitations. Gastrointestinal: Negative for abdominal pain, diarrhea, nausea and vomiting. Genitourinary: Negative for dysuria and frequency. Musculoskeletal: Negative for arthralgias and myalgias. Neurological: Positive for headaches. Negative for dizziness and light-headedness. Psychiatric/Behavioral: Negative for sleep disturbance. Objective:     /72   Pulse 91   Wt 160 lb 6.4 oz (72.8 kg)   SpO2 96%   BMI 21.75 kg/m²   Physical Exam  Constitutional:       Appearance: Normal appearance. HENT:      Right Ear: Ear canal and external ear normal. Tympanic membrane is bulging. Left Ear: Ear canal and external ear normal. Tympanic membrane is bulging. Mouth/Throat:      Mouth: Oral lesions present. Pharynx: No posterior oropharyngeal erythema. Comments: White open oral lesions  Cardiovascular:      Rate and Rhythm: Normal rate and regular rhythm. Pulses: Normal pulses. Heart sounds: Normal heart sounds. Pulmonary:      Breath sounds: Normal breath sounds. Abdominal:      General: Bowel sounds are normal.      Palpations: Abdomen is soft. Musculoskeletal:      Cervical back: Normal range of motion and neck supple. Lymphadenopathy:      Head:      Right side of head: No submental or submandibular adenopathy. Left side of head: No submental or submandibular adenopathy. Cervical: No cervical adenopathy. Skin:     General: Skin is warm and dry.    Neurological: Mental Status: He is alert and oriented to person, place, and time. Psychiatric:         Mood and Affect: Mood normal.         Assessment/Plan:   1. Chronic congestion of paranasal sinus  2. Post-COVID-19 condition     Continue to use daily antihistamine. If does improve give us a call back and will try Flonase and steroid. If continues consider CT. Return if symptoms worsen or fail to improve. No orders of the defined types were placed in this encounter. No orders of the defined types were placed in this encounter. Patient given educational materials - see patient instructions. Discussed use, benefit, and side effects of prescribed medications. All patient questions answered. Pt voiced understanding. Reviewed health maintenance. Instructed to continue current medications, diet and exercise. Patient agreed with treatment plan. Follow up as directed.      Electronically signed by DIMITRI Simms CNP on 1/20/2022 at 3:24 PM

## 2022-01-20 NOTE — PATIENT INSTRUCTIONS
Continue to use daily antihistamine. If does improve give us a call back and will try flonase and steroid. If continues consider CT.

## 2022-01-31 DIAGNOSIS — M16.11 PRIMARY OSTEOARTHRITIS OF RIGHT HIP: ICD-10-CM

## 2022-01-31 RX ORDER — HYDROCODONE BITARTRATE AND ACETAMINOPHEN 10; 325 MG/1; MG/1
1 TABLET ORAL EVERY 6 HOURS PRN
Qty: 120 TABLET | Refills: 0 | Status: SHIPPED | OUTPATIENT
Start: 2022-01-31 | End: 2022-03-14 | Stop reason: SDUPTHER

## 2022-03-14 DIAGNOSIS — M16.11 PRIMARY OSTEOARTHRITIS OF RIGHT HIP: ICD-10-CM

## 2022-03-14 RX ORDER — HYDROCODONE BITARTRATE AND ACETAMINOPHEN 10; 325 MG/1; MG/1
1 TABLET ORAL EVERY 6 HOURS PRN
Qty: 120 TABLET | Refills: 0 | Status: SHIPPED | OUTPATIENT
Start: 2022-03-14 | End: 2022-04-13 | Stop reason: SDUPTHER

## 2022-04-13 ENCOUNTER — OFFICE VISIT (OUTPATIENT)
Dept: PRIMARY CARE CLINIC | Age: 77
End: 2022-04-13
Payer: MEDICARE

## 2022-04-13 VITALS
WEIGHT: 168.6 LBS | BODY MASS INDEX: 22.84 KG/M2 | HEART RATE: 70 BPM | DIASTOLIC BLOOD PRESSURE: 68 MMHG | OXYGEN SATURATION: 98 % | SYSTOLIC BLOOD PRESSURE: 118 MMHG | HEIGHT: 72 IN

## 2022-04-13 DIAGNOSIS — R73.9 HYPERGLYCEMIA: ICD-10-CM

## 2022-04-13 DIAGNOSIS — Z00.00 MEDICARE ANNUAL WELLNESS VISIT, SUBSEQUENT: Primary | ICD-10-CM

## 2022-04-13 DIAGNOSIS — M16.11 PRIMARY OSTEOARTHRITIS OF RIGHT HIP: ICD-10-CM

## 2022-04-13 DIAGNOSIS — K40.90 LEFT INGUINAL HERNIA: ICD-10-CM

## 2022-04-13 DIAGNOSIS — M54.50 CHRONIC LOW BACK PAIN WITHOUT SCIATICA, UNSPECIFIED BACK PAIN LATERALITY: ICD-10-CM

## 2022-04-13 DIAGNOSIS — G89.29 CHRONIC LOW BACK PAIN WITHOUT SCIATICA, UNSPECIFIED BACK PAIN LATERALITY: ICD-10-CM

## 2022-04-13 LAB — HBA1C MFR BLD: 5.3 %

## 2022-04-13 PROCEDURE — 83036 HEMOGLOBIN GLYCOSYLATED A1C: CPT | Performed by: FAMILY MEDICINE

## 2022-04-13 PROCEDURE — 4040F PNEUMOC VAC/ADMIN/RCVD: CPT | Performed by: FAMILY MEDICINE

## 2022-04-13 PROCEDURE — 1123F ACP DISCUSS/DSCN MKR DOCD: CPT | Performed by: FAMILY MEDICINE

## 2022-04-13 PROCEDURE — G0439 PPPS, SUBSEQ VISIT: HCPCS | Performed by: FAMILY MEDICINE

## 2022-04-13 RX ORDER — HYDROCODONE BITARTRATE AND ACETAMINOPHEN 10; 325 MG/1; MG/1
1 TABLET ORAL EVERY 6 HOURS PRN
Qty: 120 TABLET | Refills: 0 | Status: SHIPPED | OUTPATIENT
Start: 2022-04-13 | End: 2022-05-11 | Stop reason: SDUPTHER

## 2022-04-13 ASSESSMENT — PATIENT HEALTH QUESTIONNAIRE - PHQ9
2. FEELING DOWN, DEPRESSED OR HOPELESS: 0
SUM OF ALL RESPONSES TO PHQ9 QUESTIONS 1 & 2: 0
1. LITTLE INTEREST OR PLEASURE IN DOING THINGS: 0
SUM OF ALL RESPONSES TO PHQ QUESTIONS 1-9: 0

## 2022-04-13 ASSESSMENT — LIFESTYLE VARIABLES
HOW MANY STANDARD DRINKS CONTAINING ALCOHOL DO YOU HAVE ON A TYPICAL DAY: 1 OR 2
HOW OFTEN DO YOU HAVE A DRINK CONTAINING ALCOHOL: MONTHLY OR LESS

## 2022-04-13 NOTE — PROGRESS NOTES
Medicare Annual Wellness Visit    Rinku Foley is here for Medicare AWV    Assessment & Plan   Medicare annual wellness visit, subsequent  Hyperglycemia  -     POCT glycosylated hemoglobin (Hb A1C)  Primary osteoarthritis of right hip  -     HYDROcodone-acetaminophen (NORCO)  MG per tablet; Take 1 tablet by mouth every 6 hours as needed for Pain for up to 30 days. , Disp-120 tablet, R-0Normal  Chronic low back pain without sciatica, unspecified back pain laterality  Left inguinal hernia  -     Jerilyn Mejía DO, General Surgery, [place]      Recommendations for Preventive Services Due: see orders and patient instructions/AVS.  Recommended screening schedule for the next 5-10 years is provided to the patient in written form: see Patient Instructions/AVS.     Return in 4 months (on 8/13/2022) for Medicare Annual Wellness Visit in 1 year. Subjective   The following acute and/or chronic problems were also addressed today:  PSVT  Essential hypertension     Patient's complete Health Risk Assessment and screening values have been reviewed and are found in Flowsheets. The following problems were reviewed today and where indicated follow up appointments were made and/or referrals ordered. Positive Risk Factor Screenings with Interventions:               Opioid Risk: (Low risk score <55) Opioid risk score: 13    Patient is low risk for opioid use disorder or overdose. Last PDMP [place] as Reviewed:  Review User Review Instant Review Result   Sharee Del Rio 4/13/2022 10:33 AM Reviewed PDMP [1]     Last Controlled Substance Monitoring Documentation      Office Visit from 4/13/2022 in [facility]   Periodic Controlled Substance Monitoring Possible medication side effects, risk of tolerance/dependence & alternative treatments discussed., No signs of potential drug abuse or diversion identified.  filed at 04/13/2022 1033   Chronic Pain > 50 MEDD Obtained or confirmed \"Consent for Opioid Use\" on file. filed at 04/13/2022 1033   Chronic Pain > 120 MEDD Obtained or confirmed \"Medication Contract\" on file. filed at 04/13/2022 Attila Jason and ACP:  General  In general, how would you say your health is?: Very Good  In the past 7 days, have you experienced any of the following: New or Increased Pain, New or Increased Fatigue, Loneliness, Social Isolation, Stress or Anger?: (!) Yes  Select all that apply: (!) New or Increased Pain  Do you get the social and emotional support that you need?: Yes  Do you have a Living Will?: (!) No    Advance Directives     Power of [address] [place] [address] Will ACP-Advance Directive ACP-Power of     Not on File Not on File Not on File Not on File      General Health Risk Interventions:  · No Living Will: Advance Care Planning addressed with patient today    Health Habits/Nutrition:     Physical Activity: Sufficiently Active    Days of Exercise per Week: 7 days    Minutes of Exercise per Session: 30 min     Have you lost any weight without trying in the past 3 months?: (!) Yes  Body mass index: 22.86  Have you seen the dentist within the past year?: (!) No    Health Habits/Nutrition Interventions:  · Dental exam overdue:  patient encouraged to make appointment with his/her dentist    Hearing/Vision:  Do you or your family notice any trouble with your hearing that hasn't been managed with hearing aids?: (!) Yes  Do you have difficulty driving, watching TV, or doing any of your daily activities because of your eyesight?: No  Have you had an eye exam within the past year?: (!) No  No exam data present    Hearing/Vision Interventions:  · No concerns             Objective   Vitals:    04/13/22 1028   BP: 118/68   Pulse: 70   SpO2: 98%   Weight: 168 lb 9.6 oz (76.5 kg)   Height: 6' (1.829 m)      Body mass index is 22.87 kg/m².         General Appearance: alert and oriented to person, place and time, well developed and well- nourished, in no acute distress  Skin: warm and dry, no rash or erythema  Head: normocephalic and atraumatic  Eyes: pupils equal, round, and reactive to light, extraocular eye movements intact, conjunctivae normal  ENT: tympanic membrane, external ear and ear canal normal bilaterally, nose without deformity, nasal mucosa and turbinates normal without polyps  Neck: supple and non-tender without mass, no thyromegaly or thyroid nodules, no cervical lymphadenopathy  Pulmonary/Chest: clear to auscultation bilaterally- no wheezes, rales or rhonchi, normal air movement, no respiratory distress  Cardiovascular: normal rate, regular rhythm, normal S1 and S2, no murmurs, rubs, clicks, or gallops, distal pulses intact, no carotid bruits  Abdomen: soft, non-tender, non-distended, normal bowel sounds, no masses or organomegaly  Extremities: no cyanosis, clubbing or edema  Musculoskeletal: normal range of motion, no joint swelling, deformity or tenderness  Neurologic: reflexes normal and symmetric, no cranial nerve deficit, gait, coordination and speech normal       No Known Allergies  Prior to Visit Medications    Medication Sig Taking? Authorizing Provider   HYDROcodone-acetaminophen (NORCO)  MG per tablet Take 1 tablet by mouth every 6 hours as needed for Pain for up to 30 days.  Yes Maisha Lund MD   metoprolol succinate (TOPROL XL) 25 MG extended release tablet Take 0.5 tablets by mouth daily Yes Maisha Lund MD   allopurinol (ZYLOPRIM) 300 MG tablet Take 1 tablet by mouth daily Yes Maisha Lund MD   sildenafil (VIAGRA) 100 MG tablet Take 100 mg by mouth as needed for Erectile Dysfunction Yes Historical Provider, MD   diphenhydrAMINE (BENADRYL) 25 MG tablet Take 25 mg by mouth daily Yes Historical Provider, MD   senna (SENOKOT) 8.6 MG tablet Take 1 tablet by mouth daily Yes Historical Provider, MD   aspirin 81 MG tablet Take 81 mg by mouth daily Yes Historical Provider, MD       [facility] (Including outside providers/suppliers regularly involved in providing care):   Patient Care Team:  Michelle Chapman MD as PCP - General (Family Medicine)  Michelle Chapman MD as PCP - [facility] Empaneled Provider    Reviewed and updated this visit:  Tobacco  Allergies  Meds  Med Hx  Surg Hx  Soc Hx  Fam Hx         Patient continues to use the Norco for his back pain. Not interested in seeing pain management or neurosurgery. States is functional with the medication. Patient never had issues with sugars before being in the hospital for Covid. Was started at [facility] on Metformin. Patient states he did not know he was diabetic. Has not been checking his sugars. Denies any low blood sugar reactions. We will DC metformin. Believe patient is not diabetic his sugars were only high due to Decadron that he was on for Covid    Patient with large left inguinal hernia.   Referral to general surgery for repair

## 2022-04-13 NOTE — LETTER
CONTROLLED SUBSTANCE MEDICATION AGREEMENT     Patient Name: Kristi Orellana  Patient YOB: 1945   I understand, that controlled substance medications may be used to help better manage my symptoms and to improve my ability to function at home, work and in social settings. However, I also understand that these medications do have risks, which have been discussed with me, including possible development of physical or psychological dependence. I understand that successful treatment requires mutual trust and honesty between me and my provider. I understand and agree that following this Medication Agreement is necessary in continuing my provider-patient relationship and the success of my treatment plan. Explanation from my Provider: Benefits and Goals of Controlled Substance Medications: There are two potential goals for your treatment: (1) decreased pain and suffering (2) improved daily life functions. There are many possible treatments for your chronic condition(s). Alternatives such as physical therapy, yoga, massage, home daily exercise, meditation, relaxation techniques, injections, chiropractic manipulations, surgery, cognitive therapy, hypnosis and many medications that are not habit-forming may be used. Use of controlled substance medications may be helpful, but they are unlikely to resolve all symptoms or restore all function. Explanation from my Provider: Risks of Controlled Substance Medications:  Opioid pain medications: These medications can lead to problems such as addiction/dependence, sedation, lightheadedness/dizziness, memory issues, falls, constipation, nausea, or vomiting. They may also impair the ability to drive or operate machinery. Additionally, these medications may lower testosterone levels, leading to loss of bone strength, stamina and sex drive.   They may cause problems with breathing, sleep apnea and reduced coughing, which is especially dangerous for patients with lung disease. Overdose or dangerous interactions with alcohol and other medications may occur, leading to death. Hyperalgesia may develop, which means patients receiving opioids for the treatment of pain may become more sensitive to certain painful stimuli, and in some cases, experience pain from ordinarily non-painful stimuli. Women between the ages of 14-53 who could become pregnant should carefully weigh the risks and benefits of opioids with their physicians, as these medications increase the risk of pregnancy complications, including miscarriage,  delivery and stillbirth. It is also possible for babies to be born addicted to opioids. Opioid dependence withdrawal symptoms may include; feelings of uneasiness, increased pain, irritability, belly pain, diarrhea, sweats and goose-flesh. Benzodiazepines and non-benzodiazepine sleep medications: These medications can lead to problems such as addiction/dependence, sedation, fatigue, lightheadedness, dizziness, incoordination, falls, depression, hallucinations, and impaired judgment, memory and concentration. The ability to drive and operate machinery may also be affected. Abnormal sleep-related behaviors have been reported, including sleepwalking, driving, making telephone calls, eating, or having sex while not fully awake. These medications can suppress breathing and worsen sleep apnea, particularly when combined with alcohol or other sedating medications, potentially leading to death. Dependence withdrawal symptoms may include tremors, anxiety, hallucinations and seizures. Stimulants:  Common adverse effects include addiction/dependence, increased blood  pressure and heart rate, decreased appetite, nausea, involuntary weight loss, insomnia,                                                                                                                     Initials:_______   irritability, and headaches.   These risks may increase when these medications are combined with other stimulants, such as caffeine pills or energy drinks, certain weight loss supplements and oral decongestants. Dependence withdrawal symptoms may include depressed mood, loss of interest, suicidal thoughts, anxiety, fatigue, appetite changes and agitation. Testosterone replacement therapy:  Potential side effects include increased risk of stroke and heart attack, blood clots, increased blood pressure, increased cholesterol, enlarged prostate, sleep apnea, irritability/aggression and other mood disorders, and decreased fertility. I agree and understand that I and my prescriber have the following rights and responsibilities regarding my treatment plan:     1. MY RIGHTS:  To be informed of my treatment and medication plan. To be an active participant in my health and wellbeing. 2. MY RESPONSIBILITY AND UNDERSTANDING FOR USE OF MEDICATIONS   I will take medications at the dose and frequency as directed. For my safety, I will not increase or change how I take my medications without the recommendation of my healthcare provider.  I will actively participate in any program recommended by my provider which may improve function, including social, physical, psychological programs.  I will not take my medications with alcohol or other drugs not prescribed to me. I understand that drinking alcohol with my medications increases the chances of side effects, including reduced breathing rate and could lead to personal injury when operating machinery.  I understand that if I have a history of substance use disorders, including alcohol or other illicit drugs, that I may be at increased risk of addiction to my medications.  I agree to notify my provider immediately if I should become pregnant so that my treatment plan can be adjusted.    I agree and understand that I shall only receive controlled substance medications from the prescriber that signed this agreement unless there is written agreement among other prescribers of controlled substances outlining the responsibility of the medications being prescribed.  I understand that the if the controlled medication is not helping to achieve goals, the dosage may be tapered and no longer prescribed. 3. MY RESPONSIBILITY FOR COMMUNICATION / PRESCRIPTION RENEWALS   I agree that all controlled substance medications that I take will be prescribed only by my provider. If another healthcare provider prescribes me medication in an emergency, I will notify my provider within seventy-two (72) hours.  I will arrange for refills at the prescribed interval ONLY during regular office hours. I will not ask for refills earlier than agreed, after-hours, on holidays or weekends. Refills may take up to 72 hours for processing and prescriptions to reach the pharmacy.  I will inform my other health care providers that I am taking these medications and of the existence of this [id number]. In the event of an emergency, I will provide the same information to the emergency department prescribers.  I will keep my provider updated on the pharmacy I am using for controlled medication prescription filling. Initials:_______  4. MY RESPONSIBILITY FOR PROTECTING MEDICATIONS   I will protect my prescriptions and medications. I understand that lost or misplaced prescriptions will not be replaced.  I will keep medications only for my own use and will not share them with others. I will keep all medications away from children.  I agree that if my medications are adjusted or discontinued, I will properly dispose of any remaining medications. I understand that I will be required to dispose of any remaining controlled medications as, directed by my prescriber, prior to being provided with any prescriptions for other controlled medications.   Medication drop box locations can be found at: [employer]    5. MY RESPONSIBILITY WITH ILLEGAL DRUGS    I will not use illegal or street drugs or another person's prescription medications not prescribed to me.  If there are identified addiction type symptoms, then referral to a program may be provided by my provider and I agree to follow through with this recommendation. 6. MY RESPONSIBILITY FOR COOPERATION WITH INVESTIGATIONS   I understand that my provider will comply with any applicable law and may discuss my use and/or possible misuse/abuse of controlled substances and alcohol, as appropriate, with any health care provider involved in my care, pharmacist, or legal authority.  I authorize my provider and pharmacy to cooperate fully with law enforcement agencies (as permitted by law) in the investigation of any possible misuse, sale, or other diversion of my controlled substances.  I agree to waive any applicable privilege or right of privacy or confidentiality with respect to these authorizations. 7. PROVIDERS RIGHT TO MONITOR FOR SAFETY: PRESCRIPTION MONITORING / DRUG TESTING   I consent to drug/toxicology screening and will submit to a drug screen upon my providers request to assure I am only taking the prescribed drugs for my safety monitoring. I understand that a drug screen is a laboratory test in which a sample of my urine, blood or saliva is checked to see what drugs I have been taking. This may entail an observed urine specimen, which means that a nurse or other health care provider may watch me provide urine, and I will cooperate if I am asked to provide an observed specimen.  I understand that my provider will check a copy of my State Prescription Monitoring Program () Report in order to safely prescribe medications.  Pill Counts: I consent to pill counts when requested.   I may be asked to bring all my prescribed controlled substance medications, in their original bottles, to all of my scheduled appointments. In addition, my provider may ask me to come to the practice at any time for a random pill count. 8. TERMINATION OF THIS AGREEMENT  For my safety, my prescriber has the right to stop prescribing controlled substance medications and may end this agreement. Initials:_______   Conditions that may result in termination of this agreement:  a. I do not show any improvement in pain, or my activity has not improved. b. I develop rapid tolerance or loss of improvement, as described in my treatment plan.  c. I develop significant side effects from the medication. d. My behavior is not consistent with the responsibilities outlined above, thereby causing safety concerns to continue prescribing controlled substance medications. e. I fail to follow the terms of this agreement. f. Other:____________________________       UNDERSTANDING THIS MEDICATION AGREEMENT:    I have read the above and have had all my questions answered. For chronic disease management, I know that my symptoms can be managed with many types of treatments. A chronic medication trial may be part of my treatment, but I must be an active participant in my care. Medication therapy is only one part of my symptom management plan. In some cases, there may be limited scientific evidence to support the chronic use of certain medications to improve symptoms and daily function. Furthermore, in certain circumstances, there may be scientific information that suggests that the use of chronic controlled substances may worsen my symptoms and increase my risk of unintentional death directly related to this medication therapy. I know that if my provider feels my risk from controlled medications is greater than my benefit, I will have my controlled substance medication(s) compassionately lowered or removed altogether.      I further agree to allow this office to contact my HIPAA contact if there are concerns about my safety and use of the controlled medications. I have agreed to use the prescribed controlled substance medications to me as instructed by my provider and as stated in this Medication Agreement. My initial on each page and my signature below shows that I have read each page and I have had the opportunity to ask questions with answers provided by my provider.     Patient Name (Printed): _____________________________________  Patient Signature:  ______________________   Date: _____________    Prescriber Name (Printed): ___________________________________  Prescriber Signature: _____________________  Date: _____________

## 2022-04-13 NOTE — PATIENT INSTRUCTIONS
Personalized Preventive Plan for Paola Lagunas - 4/13/2022  Medicare offers a range of preventive health benefits. Some of the tests and screenings are paid in full while other may be subject to a deductible, co-insurance, and/or copay. Some of these benefits include a comprehensive review of your medical history including lifestyle, illnesses that may run in your family, and various assessments and screenings as appropriate. After reviewing your medical record and screening and assessments performed today your provider may have ordered immunizations, labs, imaging, and/or referrals for you. A list of these orders (if applicable) as well as your Preventive Care list are included within your After Visit Summary for your review. Other Preventive Recommendations:    · A preventive eye exam performed by an eye specialist is recommended every 1-2 years to screen for glaucoma; cataracts, macular degeneration, and other eye disorders. · A preventive dental visit is recommended every 6 months. · Try to get at least 150 minutes of exercise per week or 10,000 steps per day on a pedometer . · Order or download the FREE \"Exercise & Physical Activity: Your Everyday Guide\" from [employer]. Call 6-465.736.2262 or search [employer] online. · You need 7532-1365 mg of calcium and 6785-8734 IU of vitamin D per day. It is possible to meet your calcium requirement with diet alone, but a vitamin D supplement is usually necessary to meet this goal.  · When exposed to the sun, use a sunscreen that protects against both UVA and UVB radiation with an SPF of 30 or greater. Reapply every 2 to 3 hours or after sweating, drying off with a towel, or swimming. · Always wear a seat belt when traveling in a car. Always wear a helmet when riding a bicycle or motorcycle.

## 2022-04-18 ENCOUNTER — OFFICE VISIT (OUTPATIENT)
Dept: SURGERY | Age: 77
End: 2022-04-18
Payer: MEDICARE

## 2022-04-18 VITALS
BODY MASS INDEX: 22.75 KG/M2 | RESPIRATION RATE: 12 BRPM | WEIGHT: 168 LBS | HEIGHT: 72 IN | OXYGEN SATURATION: 100 % | HEART RATE: 70 BPM

## 2022-04-18 DIAGNOSIS — R19.09 INGUINAL BULGE: Primary | ICD-10-CM

## 2022-04-18 PROCEDURE — 4040F PNEUMOC VAC/ADMIN/RCVD: CPT | Performed by: SURGERY

## 2022-04-18 PROCEDURE — 1036F TOBACCO NON-USER: CPT | Performed by: SURGERY

## 2022-04-18 PROCEDURE — 1123F ACP DISCUSS/DSCN MKR DOCD: CPT | Performed by: SURGERY

## 2022-04-18 PROCEDURE — G8427 DOCREV CUR MEDS BY ELIG CLIN: HCPCS | Performed by: SURGERY

## 2022-04-18 PROCEDURE — G8420 CALC BMI NORM PARAMETERS: HCPCS | Performed by: SURGERY

## 2022-04-18 PROCEDURE — 99203 OFFICE O/P NEW LOW 30 MIN: CPT | Performed by: SURGERY

## 2022-04-18 NOTE — PROGRESS NOTES
[id number] [facility] Surgery   History & Physical  Micheal Pandya DO  Pt Name: Sergey Gurrola  MRN: [id number]  YOB: 1945  Date of evaluation: 4/18/2022  Primary Care Physician: Tan Wiggins MD    Chief Complaint: L inguinal pain      SUBJECTIVE:    History of Present Illness: This is a 65 y.o.  male who presents for evaluation for the above, acute onset about a month ago, pt reports he sees \"swelling\" in the left inguinal region occasionally. Denies testicular pain or any history of obstruction, no other complaints at this time. Chart review performed to add information to the HPI: Yes    Past Medical History   has a past medical history of COVID, History of PSVT (paroxysmal supraventricular tachycardia), Low back pain, UTI (urinary tract infection), Uvulitis, Vasodepressor syncope, and Wound, open, face. Past Surgical History   has a past surgical history that includes Colonoscopy and Hip Arthroplasty (Right, 01/07/2019). Family History  family history includes Alcohol Abuse in his father; Cancer in his mother; Diabetes in his mother. Social History  Tobacco use:  reports that he quit smoking about 46 years ago. His smoking use included cigarettes. He has a 30.00 pack-year smoking history. He has never used smokeless tobacco.  Alcohol use:  reports no history of alcohol use. Drug use:  reports no history of drug use. Medications  Current Medications:   Current Outpatient Medications   Medication Sig Dispense Refill    HYDROcodone-acetaminophen (NORCO)  MG per tablet Take 1 tablet by mouth every 6 hours as needed for Pain for up to 30 days.  120 tablet 0    metoprolol succinate (TOPROL XL) 25 MG extended release tablet Take 0.5 tablets by mouth daily 45 tablet 3    allopurinol (ZYLOPRIM) 300 MG tablet Take 1 tablet by mouth daily 90 tablet 2    sildenafil (VIAGRA) 100 MG tablet Take 100 mg by mouth as needed for Erectile Dysfunction      diphenhydrAMINE (BENADRYL) easily. PHYSICAL EXAMINATION  Vitals:   Vitals:    04/18/22 1119   Pulse: 70   Resp: 12   SpO2: 100%       General Appearance:  awake, alert, no acute distress, well developed, well nourished   Skin:  Skin color, texture, turgor normal. No rashes or lesions. Head/face:  NCAT, face symmetrical  Eyes:  PERRL, no evidence of conjunctivitis or ptosis bilaterally  Ears:  External ears and canals grossly normal, no evidence of otorrhea. Nose/Sinuses:  Nares normal. Septum midline. Mucosa normal. No external drainage noted. Mouth/Neck:  Mucosa moist.  No external oral lesions. Trachea midline. No visible masses. Lungs:  Normal chest expansion, unlabored breathing without accessory muscle use. No audible rales, rhonchi, or wheezing. Cardiovascular: S1S2. No evidence of JVD. No evidence of pulsatile masses in abdomen  Abdomen:  Soft, non-tender, no organomegaly, no masses. Musculoskeletal: No evidence of bony/muscular deformities, trauma, atrophy of either left/right upper/lower extremity. No evidence of digital clubbing or cyanosis. Neurologic:  CN 2-12 grossly intact without obvious deficits. Grossly normal sensation in all extremities. Psychiatric: appropriate judgement and insight, appropriate recall of recent and remote memory, no evidence of depression/anxiety/agitation    DIAGNOSES:   Diagnosis Orders   1. Inguinal bulge  US PELVIS LIMITED       PLAN:  Unable to appreciate a definite bulge today in either inguinal region, will obtain soft tissue ultrasound of the left side to r/o hernia, will also scan right side to evaluate for hernia. We discussed the details of hernia repair if imaging is significant for hernia. The risks include bleeding, infection, scarring, nerve pain, risk of atrophy to testicles, risk of orchiectomy, damage to surrounding tissues, recurrence of hernia, need for further surgery in the future.  The benefits, alternatives, complications and procedure details were explained to the pt, all questions were answered.       Medical Decision Making: low complexity     Electronically signed by Maynor Miller DO on 4/18/2022 at 11:36 AM

## 2022-04-22 ENCOUNTER — HOSPITAL ENCOUNTER (OUTPATIENT)
Dept: ULTRASOUND IMAGING | Age: 77
Discharge: HOME | End: 2022-04-24
Payer: MEDICARE

## 2022-04-22 DIAGNOSIS — R19.09 INGUINAL BULGE: ICD-10-CM

## 2022-04-22 PROCEDURE — 76857 US EXAM PELVIC LIMITED: CPT

## 2022-04-22 PROCEDURE — 76705 ECHO EXAM OF ABDOMEN: CPT

## 2022-04-25 ENCOUNTER — TELEPHONE (OUTPATIENT)
Dept: SURGERY | Age: 77
End: 2022-04-25

## 2022-04-25 NOTE — TELEPHONE ENCOUNTER
4/25/2022- Spoke to patient, surgery scheduled at [facility], patient confirmed and information mailed to patient.      Surgery date/time: 5/6/2022 at 2:30pm  Arrival time: 12:30pm  PAT: 4/27 at 1:00pm  Post op: 5/23/22 at 1:50pm

## 2022-04-27 ENCOUNTER — HOSPITAL ENCOUNTER (OUTPATIENT)
Dept: PREADMISSION TESTING | Age: 77
Discharge: HOME | End: 2022-05-01

## 2022-04-27 ENCOUNTER — HOSPITAL ENCOUNTER (OUTPATIENT)
Age: 77
Setting detail: SPECIMEN
Discharge: HOME | End: 2022-04-27

## 2022-04-27 VITALS
BODY MASS INDEX: 23.03 KG/M2 | RESPIRATION RATE: 16 BRPM | SYSTOLIC BLOOD PRESSURE: 162 MMHG | DIASTOLIC BLOOD PRESSURE: 90 MMHG | OXYGEN SATURATION: 98 % | WEIGHT: 170 LBS | HEIGHT: 72 IN | TEMPERATURE: 97.4 F | HEART RATE: 66 BPM

## 2022-04-27 DIAGNOSIS — Z01.818 PRE-OP TESTING: ICD-10-CM

## 2022-04-27 DIAGNOSIS — Z01.818 PRE-OP TESTING: Primary | ICD-10-CM

## 2022-04-27 LAB
ANION GAP SERPL CALCULATED.3IONS-SCNC: 9 MMOL/L (ref 9–17)
BUN BLDV-MCNC: 17 MG/DL (ref 8–23)
CALCIUM SERPL-MCNC: 9.6 MG/DL (ref 8.6–10.4)
CHLORIDE BLD-SCNC: 102 MMOL/L (ref 98–107)
CO2: 29 MMOL/L (ref 20–31)
CREAT SERPL-MCNC: 1.13 MG/DL (ref 0.7–1.2)
GFR AFRICAN AMERICAN: >60 ML/MIN
GFR NON-AFRICAN AMERICAN: >60 ML/MIN
GFR SERPL CREATININE-BSD FRML MDRD: NORMAL ML/MIN/{1.73_M2}
GLUCOSE BLD-MCNC: 85 MG/DL (ref 70–99)
HCT VFR BLD CALC: 45 % (ref 40.7–50.3)
HEMOGLOBIN: 15.3 G/DL (ref 13–17)
MCH RBC QN AUTO: 31.5 PG (ref 25.2–33.5)
MCHC RBC AUTO-ENTMCNC: 34 G/DL (ref 28.4–34.8)
MCV RBC AUTO: 92.8 FL (ref 82.6–102.9)
NRBC AUTOMATED: 0 PER 100 WBC
PDW BLD-RTO: 12.9 % (ref 11.8–14.4)
PLATELET # BLD: 215 K/UL (ref 138–453)
PMV BLD AUTO: 10.7 FL (ref 8.1–13.5)
POTASSIUM SERPL-SCNC: 4.8 MMOL/L (ref 3.7–5.3)
RBC # BLD: 4.85 M/UL (ref 4.21–5.77)
SODIUM BLD-SCNC: 140 MMOL/L (ref 135–144)
WBC # BLD: 7.2 K/UL (ref 3.5–11.3)

## 2022-04-27 RX ORDER — DIPHENHYDRAMINE HCL 25 MG
25 CAPSULE ORAL EVERY 6 HOURS PRN
COMMUNITY

## 2022-04-27 RX ORDER — ASPIRIN 81 MG/1
81 TABLET ORAL DAILY
COMMUNITY

## 2022-04-27 RX ORDER — DOXYCYCLINE HYCLATE 50 MG/1
324 CAPSULE, GELATIN COATED ORAL
COMMUNITY
End: 2022-04-27

## 2022-04-27 RX ORDER — METOPROLOL SUCCINATE 25 MG/1
25 TABLET, EXTENDED RELEASE ORAL DAILY
COMMUNITY

## 2022-05-05 ENCOUNTER — ANESTHESIA EVENT (OUTPATIENT)
Dept: OPERATING ROOM | Age: 77
End: 2022-05-05
Payer: MEDICARE

## 2022-05-05 ENCOUNTER — TELEPHONE (OUTPATIENT)
Dept: SURGERY | Age: 77
End: 2022-05-05

## 2022-05-05 NOTE — TELEPHONE ENCOUNTER
5/5/22- Spoke to patient, letting them know surgery start time moved to 2pm, arrival is 12pm. Patient confirmed

## 2022-05-06 ENCOUNTER — ANESTHESIA (OUTPATIENT)
Dept: OPERATING ROOM | Age: 77
End: 2022-05-06
Payer: MEDICARE

## 2022-05-06 ENCOUNTER — HOSPITAL ENCOUNTER (OUTPATIENT)
Age: 77
Setting detail: OUTPATIENT SURGERY
Discharge: HOME | End: 2022-05-06
Attending: SURGERY | Admitting: SURGERY
Payer: MEDICARE

## 2022-05-06 VITALS
OXYGEN SATURATION: 100 % | HEART RATE: 76 BPM | BODY MASS INDEX: 23.48 KG/M2 | WEIGHT: 173.13 LBS | SYSTOLIC BLOOD PRESSURE: 142 MMHG | RESPIRATION RATE: 20 BRPM | DIASTOLIC BLOOD PRESSURE: 84 MMHG | TEMPERATURE: 97.4 F

## 2022-05-06 VITALS — DIASTOLIC BLOOD PRESSURE: 76 MMHG | OXYGEN SATURATION: 100 % | TEMPERATURE: 97.2 F | SYSTOLIC BLOOD PRESSURE: 133 MMHG

## 2022-05-06 DIAGNOSIS — K40.20 NON-RECURRENT BILATERAL INGUINAL HERNIA WITHOUT OBSTRUCTION OR GANGRENE: Primary | ICD-10-CM

## 2022-05-06 PROCEDURE — 2709999900 HC NON-CHARGEABLE SUPPLY: Performed by: SURGERY

## 2022-05-06 PROCEDURE — 3600000009 HC SURGERY ROBOT BASE: Performed by: SURGERY

## 2022-05-06 PROCEDURE — 2580000003 HC RX 258: Performed by: ANESTHESIOLOGY

## 2022-05-06 PROCEDURE — 3700000000 HC ANESTHESIA ATTENDED CARE: Performed by: SURGERY

## 2022-05-06 PROCEDURE — 49650 LAP ING HERNIA REPAIR INIT: CPT | Performed by: SURGERY

## 2022-05-06 PROCEDURE — 7100000001 HC PACU RECOVERY - ADDTL 15 MIN: Performed by: SURGERY

## 2022-05-06 PROCEDURE — 7100000010 HC PHASE II RECOVERY - FIRST 15 MIN: Performed by: SURGERY

## 2022-05-06 PROCEDURE — 6360000002 HC RX W HCPCS: Performed by: SURGERY

## 2022-05-06 PROCEDURE — 7100000011 HC PHASE II RECOVERY - ADDTL 15 MIN: Performed by: SURGERY

## 2022-05-06 PROCEDURE — 2500000003 HC RX 250 WO HCPCS: Performed by: SURGERY

## 2022-05-06 PROCEDURE — 6360000002 HC RX W HCPCS: Performed by: ANESTHESIOLOGY

## 2022-05-06 PROCEDURE — 3700000001 HC ADD 15 MINUTES (ANESTHESIA): Performed by: SURGERY

## 2022-05-06 PROCEDURE — 2500000003 HC RX 250 WO HCPCS: Performed by: ANESTHESIOLOGY

## 2022-05-06 PROCEDURE — 7100000000 HC PACU RECOVERY - FIRST 15 MIN: Performed by: SURGERY

## 2022-05-06 PROCEDURE — C1781 MESH (IMPLANTABLE): HCPCS | Performed by: SURGERY

## 2022-05-06 PROCEDURE — 3600000019 HC SURGERY ROBOT ADDTL 15MIN: Performed by: SURGERY

## 2022-05-06 PROCEDURE — 6370000000 HC RX 637 (ALT 250 FOR IP)

## 2022-05-06 PROCEDURE — 6360000002 HC RX W HCPCS

## 2022-05-06 PROCEDURE — S2900 ROBOTIC SURGICAL SYSTEM: HCPCS | Performed by: SURGERY

## 2022-05-06 DEVICE — MESH SURG W3.5XL6IN POLY SELF FIXATING RECT W/ RESRB PLA: Type: IMPLANTABLE DEVICE | Site: GROIN | Status: FUNCTIONAL

## 2022-05-06 RX ORDER — SODIUM CHLORIDE 0.9 % (FLUSH) 0.9 %
5-40 SYRINGE (ML) INJECTION PRN
Status: DISCONTINUED | OUTPATIENT
Start: 2022-05-06 | End: 2022-05-06 | Stop reason: HOSPADM

## 2022-05-06 RX ORDER — GLYCOPYRROLATE 1 MG/5 ML
SYRINGE (ML) INTRAVENOUS PRN
Status: DISCONTINUED | OUTPATIENT
Start: 2022-05-06 | End: 2022-05-06 | Stop reason: SDUPTHER

## 2022-05-06 RX ORDER — MORPHINE SULFATE 1 MG/ML
1 INJECTION, SOLUTION EPIDURAL; INTRATHECAL; INTRAVENOUS EVERY 5 MIN PRN
Status: DISCONTINUED | OUTPATIENT
Start: 2022-05-06 | End: 2022-05-06 | Stop reason: HOSPADM

## 2022-05-06 RX ORDER — ONDANSETRON 2 MG/ML
INJECTION INTRAMUSCULAR; INTRAVENOUS
Status: COMPLETED
Start: 2022-05-06 | End: 2022-05-06

## 2022-05-06 RX ORDER — PROPOFOL 10 MG/ML
INJECTION, EMULSION INTRAVENOUS PRN
Status: DISCONTINUED | OUTPATIENT
Start: 2022-05-06 | End: 2022-05-06 | Stop reason: SDUPTHER

## 2022-05-06 RX ORDER — ONDANSETRON 2 MG/ML
4 INJECTION INTRAMUSCULAR; INTRAVENOUS
Status: DISCONTINUED | OUTPATIENT
Start: 2022-05-06 | End: 2022-05-06 | Stop reason: HOSPADM

## 2022-05-06 RX ORDER — HYDROCODONE BITARTRATE AND ACETAMINOPHEN 5; 325 MG/1; MG/1
1 TABLET ORAL EVERY 6 HOURS PRN
Qty: 15 TABLET | Refills: 0 | Status: SHIPPED | OUTPATIENT
Start: 2022-05-06 | End: 2022-05-13

## 2022-05-06 RX ORDER — DIPHENHYDRAMINE HYDROCHLORIDE 50 MG/ML
12.5 INJECTION INTRAMUSCULAR; INTRAVENOUS
Status: DISCONTINUED | OUTPATIENT
Start: 2022-05-06 | End: 2022-05-06 | Stop reason: HOSPADM

## 2022-05-06 RX ORDER — NEOSTIGMINE METHYLSULFATE 5 MG/5 ML
SYRINGE (ML) INTRAVENOUS PRN
Status: DISCONTINUED | OUTPATIENT
Start: 2022-05-06 | End: 2022-05-06 | Stop reason: SDUPTHER

## 2022-05-06 RX ORDER — HYDROCODONE BITARTRATE AND ACETAMINOPHEN 5; 325 MG/1; MG/1
2 TABLET ORAL
Status: DISCONTINUED | OUTPATIENT
Start: 2022-05-06 | End: 2022-05-06 | Stop reason: HOSPADM

## 2022-05-06 RX ORDER — SODIUM CHLORIDE 9 MG/ML
25 INJECTION, SOLUTION INTRAVENOUS PRN
Status: DISCONTINUED | OUTPATIENT
Start: 2022-05-06 | End: 2022-05-06 | Stop reason: HOSPADM

## 2022-05-06 RX ORDER — BUPIVACAINE HYDROCHLORIDE 2.5 MG/ML
INJECTION, SOLUTION EPIDURAL; INFILTRATION; INTRACAUDAL
Status: DISCONTINUED
Start: 2022-05-06 | End: 2022-05-06 | Stop reason: HOSPADM

## 2022-05-06 RX ORDER — SODIUM CHLORIDE 9 MG/ML
INJECTION, SOLUTION INTRAVENOUS PRN
Status: DISCONTINUED | OUTPATIENT
Start: 2022-05-06 | End: 2022-05-06 | Stop reason: HOSPADM

## 2022-05-06 RX ORDER — HYDROCODONE BITARTRATE AND ACETAMINOPHEN 5; 325 MG/1; MG/1
TABLET ORAL
Status: COMPLETED
Start: 2022-05-06 | End: 2022-05-06

## 2022-05-06 RX ORDER — LIDOCAINE HYDROCHLORIDE 10 MG/ML
1 INJECTION, SOLUTION EPIDURAL; INFILTRATION; INTRACAUDAL; PERINEURAL
Status: DISCONTINUED | OUTPATIENT
Start: 2022-05-06 | End: 2022-05-06 | Stop reason: HOSPADM

## 2022-05-06 RX ORDER — SODIUM CHLORIDE, SODIUM LACTATE, POTASSIUM CHLORIDE, CALCIUM CHLORIDE 600; 310; 30; 20 MG/100ML; MG/100ML; MG/100ML; MG/100ML
INJECTION, SOLUTION INTRAVENOUS CONTINUOUS
Status: DISCONTINUED | OUTPATIENT
Start: 2022-05-06 | End: 2022-05-06 | Stop reason: HOSPADM

## 2022-05-06 RX ORDER — KETOROLAC TROMETHAMINE 30 MG/ML
INJECTION, SOLUTION INTRAMUSCULAR; INTRAVENOUS PRN
Status: DISCONTINUED | OUTPATIENT
Start: 2022-05-06 | End: 2022-05-06 | Stop reason: SDUPTHER

## 2022-05-06 RX ORDER — ONDANSETRON 2 MG/ML
INJECTION INTRAMUSCULAR; INTRAVENOUS PRN
Status: DISCONTINUED | OUTPATIENT
Start: 2022-05-06 | End: 2022-05-06 | Stop reason: SDUPTHER

## 2022-05-06 RX ORDER — SODIUM CHLORIDE 0.9 % (FLUSH) 0.9 %
5-40 SYRINGE (ML) INJECTION EVERY 12 HOURS SCHEDULED
Status: DISCONTINUED | OUTPATIENT
Start: 2022-05-06 | End: 2022-05-06 | Stop reason: HOSPADM

## 2022-05-06 RX ORDER — ROCURONIUM BROMIDE 10 MG/ML
INJECTION, SOLUTION INTRAVENOUS PRN
Status: DISCONTINUED | OUTPATIENT
Start: 2022-05-06 | End: 2022-05-06 | Stop reason: SDUPTHER

## 2022-05-06 RX ORDER — DOCUSATE SODIUM 100 MG/1
100 CAPSULE, LIQUID FILLED ORAL 2 TIMES DAILY
Qty: 20 CAPSULE | Refills: 0 | Status: SHIPPED | OUTPATIENT
Start: 2022-05-06

## 2022-05-06 RX ORDER — FENTANYL CITRATE 50 UG/ML
INJECTION, SOLUTION INTRAMUSCULAR; INTRAVENOUS PRN
Status: DISCONTINUED | OUTPATIENT
Start: 2022-05-06 | End: 2022-05-06 | Stop reason: SDUPTHER

## 2022-05-06 RX ORDER — DEXAMETHASONE SODIUM PHOSPHATE 10 MG/ML
INJECTION, SOLUTION INTRAMUSCULAR; INTRAVENOUS PRN
Status: DISCONTINUED | OUTPATIENT
Start: 2022-05-06 | End: 2022-05-06 | Stop reason: SDUPTHER

## 2022-05-06 RX ADMIN — Medication 2 MG: at 19:00

## 2022-05-06 RX ADMIN — DEXAMETHASONE SODIUM PHOSPHATE 4 MG: 10 INJECTION INTRAMUSCULAR; INTRAVENOUS at 17:25

## 2022-05-06 RX ADMIN — FENTANYL CITRATE 50 MCG: 50 INJECTION, SOLUTION INTRAMUSCULAR; INTRAVENOUS at 17:07

## 2022-05-06 RX ADMIN — PROPOFOL 150 MG: 10 INJECTION, EMULSION INTRAVENOUS at 17:07

## 2022-05-06 RX ADMIN — KETOROLAC TROMETHAMINE 15 MG: 30 INJECTION, SOLUTION INTRAMUSCULAR at 18:57

## 2022-05-06 RX ADMIN — HYDROCODONE BITARTRATE AND ACETAMINOPHEN 2 TABLET: 5; 325 TABLET ORAL at 20:00

## 2022-05-06 RX ADMIN — SODIUM CHLORIDE, POTASSIUM CHLORIDE, SODIUM LACTATE AND CALCIUM CHLORIDE: 600; 310; 30; 20 INJECTION, SOLUTION INTRAVENOUS at 14:58

## 2022-05-06 RX ADMIN — HYDROMORPHONE HYDROCHLORIDE 0.5 MG: 1 INJECTION, SOLUTION INTRAMUSCULAR; INTRAVENOUS; SUBCUTANEOUS at 19:44

## 2022-05-06 RX ADMIN — HYDROMORPHONE HYDROCHLORIDE 0.5 MG: 1 INJECTION, SOLUTION INTRAMUSCULAR; INTRAVENOUS; SUBCUTANEOUS at 19:38

## 2022-05-06 RX ADMIN — CEFAZOLIN SODIUM 2000 MG: 10 INJECTION, POWDER, FOR SOLUTION INTRAVENOUS at 17:21

## 2022-05-06 RX ADMIN — ONDANSETRON 4 MG: 2 INJECTION INTRAMUSCULAR; INTRAVENOUS at 19:38

## 2022-05-06 RX ADMIN — Medication 0.4 MG: at 19:00

## 2022-05-06 RX ADMIN — Medication 0.4 MG: at 17:33

## 2022-05-06 RX ADMIN — ONDANSETRON 4 MG: 2 INJECTION INTRAMUSCULAR; INTRAVENOUS at 18:53

## 2022-05-06 RX ADMIN — ROCURONIUM BROMIDE 40 MG: 10 INJECTION INTRAVENOUS at 17:07

## 2022-05-06 RX ADMIN — FENTANYL CITRATE 50 MCG: 50 INJECTION, SOLUTION INTRAMUSCULAR; INTRAVENOUS at 18:55

## 2022-05-06 ASSESSMENT — PAIN SCALES - GENERAL
PAINLEVEL_OUTOF10: 6
PAINLEVEL_OUTOF10: 5
PAINLEVEL_OUTOF10: 6
PAINLEVEL_OUTOF10: 4

## 2022-05-06 ASSESSMENT — PULMONARY FUNCTION TESTS
PIF_VALUE: 1
PIF_VALUE: 32
PIF_VALUE: 33
PIF_VALUE: 18
PIF_VALUE: 28
PIF_VALUE: 21
PIF_VALUE: 30
PIF_VALUE: 28
PIF_VALUE: 28
PIF_VALUE: 34
PIF_VALUE: 1
PIF_VALUE: 31
PIF_VALUE: 29
PIF_VALUE: 1
PIF_VALUE: 28
PIF_VALUE: 4
PIF_VALUE: 27
PIF_VALUE: 27
PIF_VALUE: 28
PIF_VALUE: 30
PIF_VALUE: 26
PIF_VALUE: 31
PIF_VALUE: 26
PIF_VALUE: 27
PIF_VALUE: 29
PIF_VALUE: 24
PIF_VALUE: 18
PIF_VALUE: 27
PIF_VALUE: 31
PIF_VALUE: 28
PIF_VALUE: 27
PIF_VALUE: 27
PIF_VALUE: 32
PIF_VALUE: 28
PIF_VALUE: 1
PIF_VALUE: 27
PIF_VALUE: 31
PIF_VALUE: 27
PIF_VALUE: 31
PIF_VALUE: 1
PIF_VALUE: 31
PIF_VALUE: 29
PIF_VALUE: 31
PIF_VALUE: 18
PIF_VALUE: 27
PIF_VALUE: 31
PIF_VALUE: 27
PIF_VALUE: 28
PIF_VALUE: 28
PIF_VALUE: 16
PIF_VALUE: 19
PIF_VALUE: 18
PIF_VALUE: 30
PIF_VALUE: 28
PIF_VALUE: 30
PIF_VALUE: 18
PIF_VALUE: 27
PIF_VALUE: 27
PIF_VALUE: 4
PIF_VALUE: 28
PIF_VALUE: 29
PIF_VALUE: 19
PIF_VALUE: 32
PIF_VALUE: 27
PIF_VALUE: 4
PIF_VALUE: 18
PIF_VALUE: 27
PIF_VALUE: 27
PIF_VALUE: 28
PIF_VALUE: 31
PIF_VALUE: 31
PIF_VALUE: 18
PIF_VALUE: 27
PIF_VALUE: 28
PIF_VALUE: 27
PIF_VALUE: 23
PIF_VALUE: 17
PIF_VALUE: 27
PIF_VALUE: 31
PIF_VALUE: 32
PIF_VALUE: 24
PIF_VALUE: 25
PIF_VALUE: 27
PIF_VALUE: 1
PIF_VALUE: 6
PIF_VALUE: 1
PIF_VALUE: 18
PIF_VALUE: 35
PIF_VALUE: 30
PIF_VALUE: 22
PIF_VALUE: 28
PIF_VALUE: 29
PIF_VALUE: 28
PIF_VALUE: 4
PIF_VALUE: 3
PIF_VALUE: 17
PIF_VALUE: 31
PIF_VALUE: 29
PIF_VALUE: 27
PIF_VALUE: 31
PIF_VALUE: 27
PIF_VALUE: 27
PIF_VALUE: 24
PIF_VALUE: 28
PIF_VALUE: 31
PIF_VALUE: 30
PIF_VALUE: 30
PIF_VALUE: 29
PIF_VALUE: 28
PIF_VALUE: 28
PIF_VALUE: 31
PIF_VALUE: 27
PIF_VALUE: 18
PIF_VALUE: 26
PIF_VALUE: 33
PIF_VALUE: 30
PIF_VALUE: 27
PIF_VALUE: 28
PIF_VALUE: 31

## 2022-05-06 ASSESSMENT — PAIN SCALES - WONG BAKER
WONGBAKER_NUMERICALRESPONSE: 0
WONGBAKER_NUMERICALRESPONSE: 0

## 2022-05-06 ASSESSMENT — PAIN DESCRIPTION - DESCRIPTORS: DESCRIPTORS: BURNING;PRESSURE

## 2022-05-06 ASSESSMENT — PAIN - FUNCTIONAL ASSESSMENT
PAIN_FUNCTIONAL_ASSESSMENT: 0-10
PAIN_FUNCTIONAL_ASSESSMENT: NONE - DENIES PAIN

## 2022-05-06 ASSESSMENT — PAIN DESCRIPTION - LOCATION
LOCATION: ABDOMEN

## 2022-05-06 NOTE — ANESTHESIA POSTPROCEDURE EVALUATION
POST- ANESTHESIA EVALUATION       Pt Name: Corinne Mabry  MRN: [id number]  YOB: 1945  Date of evaluation: 5/6/2022  Time:  7:15 PM      /76   Pulse 69   Temp 97.4 °F (36.3 °C) (Infrared)   Resp 15   Wt 173 lb 2 oz (78.5 kg)   SpO2 100%   BMI 23.48 kg/m²      Consciousness Level  Awake  Cardiopulmonary Status  Stable  Pain Adequately Treated YES  Nausea / Vomiting  NO  Adequate Hydration  YES  Anesthesia Related Complications NONE      Electronically signed by Jagdeep Bradshaw MD on 5/6/2022 at 7:15 PM       Department of Anesthesiology  Postprocedure Note    Patient: Corinne Mabry  MRN: [id number]  [place]: 1945  Date of evaluation: 5/6/2022  Time:  7:15 PM     Procedure Summary     Date: 05/06/22 Room / Location: [address] / [address] [place] [address]    Anesthesia Start: 7259 Anesthesia Stop: 1911    Procedure: LAPAROSCOPIC ROBOTIC BILATERAL INGUINAL HERNIA REPAIR WITH  MESH (Bilateral ) Diagnosis: (BILATERAL INGUINAL HERNIA REPAIR)    Surgeons: Marisel Hernandez DO Responsible Provider: Jagdeep Bradshaw MD    Anesthesia Type: general ASA Status: 3          Anesthesia Type: No value filed. Kalli Phase I: Kalli Score: 10    Kalli Phase II:      Last vitals: Reviewed and per EMR flowsheets.        Anesthesia Post Evaluation

## 2022-05-06 NOTE — ANESTHESIA PRE PROCEDURE
Department of Anesthesiology  Preprocedure Note       Name:  Sergey Gurrola   Age:  65 y.o.  :  1945                                          MRN:  [id number]         Date:  2022      Surgeon: Oswaldo Zamudio):  Micheal Pandya DO    Procedure: Procedure(s):  BILATERAL INGUINAL HERNIA REPAIR LAPAROSCOPIC ROBOTIC WITH possible MESH    Medications prior to admission:   Prior to Admission medications    Medication Sig Start Date End Date Taking? Authorizing Provider   aspirin 81 MG EC tablet Take 81 mg by mouth daily    Historical Provider, MD   diphenhydrAMINE (BENADRYL) 25 MG capsule Take 25 mg by mouth every 6 hours as needed for Itching    Historical Provider, MD   metoprolol succinate (TOPROL XL) 25 MG extended release tablet Take 25 mg by mouth daily . 5mg daily    Historical Provider, MD   HYDROcodone-acetaminophen (NORCO)  MG per tablet Take 1 tablet by mouth every 6 hours as needed for Pain for up to 30 days.  22  Tan Wiggins MD   allopurinol (ZYLOPRIM) 300 MG tablet Take 1 tablet by mouth daily 22   Tan Wiggins MD   sildenafil (VIAGRA) 100 MG tablet Take 100 mg by mouth as needed for Erectile Dysfunction    Historical Provider, MD   senna (SENOKOT) 8.6 MG tablet Take 1 tablet by mouth daily    Historical Provider, MD       Current medications:    Current Facility-Administered Medications   Medication Dose Route Frequency Provider Last Rate Last Admin    lidocaine PF 1 % injection 1 mL  1 mL IntraDERmal Once PRN Nigel Barroso MD        lactated ringers infusion   IntraVENous Continuous Nigle Barroso MD        sodium chloride flush 0.9 % injection 5-40 mL  5-40 mL IntraVENous 2 times per day Nigel Barroso MD        sodium chloride flush 0.9 % injection 5-40 mL  5-40 mL IntraVENous PRN Nigel Barroso MD        0.9 % sodium chloride infusion   IntraVENous PRN Nigel Barroso MD        ceFAZolin (ANCEF) 2000 mg in dextrose 5 % 50 mL IVPB  2,000 mg IntraVENous On Call to Attila Jimenez DO           Allergies:  No Known Allergies    Problem List:    Patient Active Problem List   Diagnosis Code    Arterial insufficiency (HCC) I77.1    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    Diverticulosis K57.90    Fatty liver K76.0    Gout M10.9    Lower back pain M54.50    Male erectile disorder N52.9    Polyarthritis M13.0    Raynaud's disease I73.00    Anxiety F41.9    Vasodepressor syncope R55    History of PSVT (paroxysmal supraventricular tachycardia) Z86.79    Gastroesophageal reflux disease without esophagitis K21.9    Essential (primary) hypertension I10    PSVT (paroxysmal supraventricular tachycardia) ([facility]) I47.1    COVID U07.1       Past Medical History:        Diagnosis Date    COVID 2021    History of PSVT (paroxysmal supraventricular tachycardia) 2016    Low back pain     UTI (urinary tract infection)     Uvulitis     Vasodepressor syncope 2016    Wound, open, face        Past Surgical History:        Procedure Laterality Date    COLONOSCOPY      HIP ARTHROPLASTY Right 2019       Social History:    Social History     Tobacco Use    Smoking status: Former Smoker     Packs/day: 2.00     Years: 15.00     Pack years: 30.00     Types: Cigarettes     Quit date: 1976     Years since quittin.3    Smokeless tobacco: Never Used   Substance Use Topics    Alcohol use:  Yes     Alcohol/week: 0.0 standard drinks     Comment: social                                Counseling given: Not Answered      Vital Signs (Current):   Vitals:    22 1433   Weight: 173 lb 2 oz (78.5 kg)                                              BP Readings from Last 3 Encounters:   22 (!) 162/90   22 118/68   22 112/72       NPO Status: Time of last liquid consumption: 0900 (sips of water with am meds)                        Time of last solid consumption: 1900                        Date of last liquid consumption: 05/06/22                        Date of last solid food consumption: 05/05/22    BMI:   Wt Readings from Last 3 Encounters:   05/06/22 173 lb 2 oz (78.5 kg)   04/27/22 170 lb (77.1 kg)   04/18/22 168 lb (76.2 kg)     Body mass index is 23.48 kg/m². CBC:   Lab Results   Component Value Date    WBC 7.2 04/27/2022    RBC 4.85 04/27/2022    HGB 15.3 04/27/2022    HCT 45.0 04/27/2022    MCV 92.8 04/27/2022    RDW 12.9 04/27/2022     04/27/2022       CMP:   Lab Results   Component Value Date     04/27/2022    K 4.8 04/27/2022     04/27/2022    CO2 29 04/27/2022    BUN 17 04/27/2022    CREATININE 1.13 04/27/2022    GFRAA >60 04/27/2022    LABGLOM >60 04/27/2022    GLUCOSE 85 04/27/2022    GLUCOSE 94 06/02/2021    PROT 7.5 06/02/2021    CALCIUM 9.6 04/27/2022    BILITOT 0.8 06/02/2021    ALKPHOS 73 06/02/2021    AST 45 06/02/2021    ALT 49 06/02/2021       POC Tests: No results for input(s): POCGLU, POCNA, POCK, POCCL, POCBUN, POCHEMO, POCHCT in the last 72 hours.     Coags: No results found for: PROTIME, INR, APTT    HCG (If Applicable): No results found for: PREGTESTUR, PREGSERUM, HCG, HCGQUANT     ABGs: No results found for: PHART, PO2ART, WWO8ATI, WLY9PTX, BEART, P2TOWHWV     Type & Screen (If Applicable):  No results found for: LABABO, LABRH    Drug/Infectious Status (If Applicable):  No results found for: HIV, HEPCAB    COVID-19 Screening (If Applicable): No results found for: COVID19        Anesthesia Evaluation  Patient summary reviewed and Nursing notes reviewed no history of anesthetic complications:   Airway: Mallampati: II  TM distance: >3 FB   Neck ROM: full  Mouth opening: < 3 FB Dental: normal exam         Pulmonary:Negative Pulmonary ROS and normal exam  breath sounds clear to auscultation                             Cardiovascular:    (+) hypertension: no interval change, dysrhythmias:,       ECG reviewed  Rhythm: regular  Rate: normal                 ROS comment: History of PSVT (paroxysmal supraventricular tachycardia)     Neuro/Psych:   Negative Neuro/Psych ROS  (+) depression/anxiety              ROS comment: Autonomic nervous system disorder GI/Hepatic/Renal:   (+) GERD: no interval change, liver disease:,           Endo/Other: Negative Endo/Other ROS                    Abdominal:             Vascular:   + PVD, aortic or cerebral, . ROS comment: Raynaud's disease. Other Findings:           Anesthesia Plan      general     ASA 3       Induction: intravenous. MIPS: Postoperative opioids intended and Prophylactic antiemetics administered. Anesthetic plan and risks discussed with patient.                       Linsey Erickson MD   5/6/2022

## 2022-05-06 NOTE — H&P
[address]      Patient's Name/ Date of Birth/ Gender: Lizabeth Vance / [birth year] (65 y.o.) / male     Attending physician: Kasia Astudillo DO    CC: bilateral inguinal hernia    History of present Illness: Lizabeth Vance is a 65 y.o. male, presents for bilateral inguinal hernia repair. Past Medical History:  has a past medical history of COVID, History of PSVT (paroxysmal supraventricular tachycardia), Low back pain, UTI (urinary tract infection), Uvulitis, Vasodepressor syncope, and Wound, open, face. Past Surgical History:   Past Surgical History:   Procedure Laterality Date    COLONOSCOPY      HIP ARTHROPLASTY Right 01/07/2019       Social History:  reports that he quit smoking about 46 years ago. His smoking use included cigarettes. He has a 30.00 pack-year smoking history. He has never used smokeless tobacco. He reports current alcohol use. He reports that he does not use drugs. Family History: family history includes Alcohol Abuse in his father; Cancer in his mother; Diabetes in his mother. Review of Systems:   General: Denies fever, chills, night sweats, weight loss, malaise, fatigue  HEENT: Denies sore throat, sinus problems, allergic rhinosinusitis  Card: Denies chest pain, palpitations, orthopnea/PND. Denies h/o murmurs  Pulm: Denies cough, shortness of breath, WU  GI:   Denies history of constipation, diarrhea, hematochezia or melena  : Denies polyuria, dysuria, hematuria  Endo: Denies diabetes, thyroid problems. Heme: Denies anemia, h/o bleeding or clotting problems. Neuro: Denies h/o CVA, TIA  Skin: Denies rashes, ulcers  Musculoskeletal: Denies muscle, joint, back pain. Allergies: Patient has no known allergies.     Current Meds:  Current Facility-Administered Medications:     lidocaine PF 1 % injection 1 mL, 1 mL, IntraDERmal, Once PRN, Jaison Rahman MD    lactated ringers infusion, , IntraVENous, Continuous, Jermaine Debbrah Kayser, MD    sodium chloride flush 0.9 % injection 5-40 mL, 5-40 mL, IntraVENous, 2 times per day, Tiffany Tristan MD    sodium chloride flush 0.9 % injection 5-40 mL, 5-40 mL, IntraVENous, PRN, Tiffany Tristan MD    0.9 % sodium chloride infusion, , IntraVENous, PRN, Tiffany Tristan MD    ceFAZolin (ANCEF) 2000 mg in dextrose 5 % 50 mL IVPB, 2,000 mg, IntraVENous, On Call to [address] [place] [address], Kathy Gonzales DO      Physical Exam:  Vital signs and Nurse's note reviewed  Gen:  A&Ox3, NAD  HEENT: NCAT, PERRLA, EOMI, no scleral icterus, oral mucosa moist  Neck: Trachea midline without obvious masses or lesions  Chest: Symmetric rise with inhalation, no evidence of trauma  CVS: S1S2  Resp: Good bilateral air entry, no audible wheeze or rhonchi  Abd: soft, non-tender, non-distended, no hepatosplenomegaly or palpable masses  Ext: No clubbing, cyanosis, edema, peripheral pulses 2+ Rad/Fem/DP/PT  CNS: Moves all extremities, no gross focal motor deficits  Skin: No erythema or ulcerations     Labs:   CBC: No results for input(s): WBC, HGB, PLT in the last 72 hours. BMP:  No results for input(s): NA, K, CL, CO2, BUN, CREATININE, GLUCOSE in the last 72 hours. Hepatic: No results for input(s): AST, ALT, ALB, ALKPHOS, BILITOT, BILIDIR, LIPASE, AMYLASE in the last 72 hours. Coagulation: No results for input(s): APTT, PROT, INR in the last 72 hours. Assessment:    Radha Mccracken is a 65 y.o. male with     bilateral inguinal hernia    Plan:    1.  To OR for robot bilateral inguinal hernia repair with mesh, all questions answered, written informed consent obtained      Kathy Gonzales DO  5/6/2022

## 2022-05-06 NOTE — OP NOTE
Operative Note      Patient: Daron Saunders  YOB: 1945  MRN: [id number]    Date of Procedure: 5/6/2022    Pre-Op Diagnosis: BILATERAL INGUINAL HERNIA REPAIR    Post-Op Diagnosis:   Left indirect inguinal hernia with incarcerated sigmoid colon  Right direct inguinal hernia with incarcerated preperitoneal fat       Procedure(s):  LAPAROSCOPIC ROBOTIC BILATERAL INGUINAL HERNIA REPAIR WITH  MESH    Surgeon(s):  Rebecca Jacob DO    Assistant:   * No surgical staff found *    Anesthesia: General    Estimated Blood Loss (mL): less than 47YY    Complications: None    Specimens:   * No specimens in log *    Implants:  Implant Name Type Inv. Item Serial No.  Lot No. LRB No. Used Action   MESH SURG W3.5XL6IN POLY SELF FIXATING RECT W/ RESRB PARDEEP - [id number]  MESH SURG W3.5XL6IN POLY SELF FIXATING RECT W/ RESRB PARDEEP  [employer]-WD [id number] Left 1 Implanted   MESH SURG W3.5XL6IN POLY SELF FIXATING RECT W/ RESRB PARDEEP - [id number]  MESH SURG W3.5XL6IN POLY SELF FIXATING RECT W/ RESRB PARDEEP  [employer] US SURGICAL-WD [id number] Right 1 Implanted         Drains: * No LDAs found *    Findings: as above. Wound class 1    Detailed Description of Procedure:       HISTORY: The patient is a 65y.o. year old male with history of above preop diagnosis. The risk, benefits, expected outcome, and alternatives to the procedure were explained to the patient's understanding and written informed consent was obtained. DESCRIPTION OF PROCEDURE:  Patient was brought to the operating suite and placed on the operating table in supine position. Timeout was performed verifying correct patient, position, equipment and procedure to be performed. EPC cuffs were applied and preoperative antibiotics were infused. General anesthesia was administered and the patient was endotracheally intubated. The patient's abdomen including genitalia was prepped and draped in the usual sterile fashion.     1cm incision was made in the left upper quadrant at Mejias's point and Veress needle was inserted. A saline drop test was used to confirm entrance into the abdomen. Pneumoperitoneum was created with insufflation of carbon dioxide to 15 mmHg. An 8 mmHg port was placed in the LUQ abdomen using an Optiview obturator with no damage to the underlying structures. Remaining ports were placed under direct visualization along the same line in the midline abdomen and the RUQ abdomen. Robot was docked without complication. A 30 degree scope was placed into the abdomen. Both inguinal regions were inspected and the median umbilical ligament, medial umbilical ligaments, and lateral umbilical folds were identified. Inguinal hernia noted on both side. Bilaterally, the peritoneum was incised with scissor electrocautery along a line 2 cm above the ASIS to the medial umbilical ligament. The peritoneal flap was mobilized inferiorly using blunt dissection, this extended from the lateral edge of the internal ring to the exposing the medial and inferior aspects of the pubic tubercle. The indirect, direct and femoral hernia spaces were visualized. Hernias were found in the indirect space on the left and direct space on the right. Starting on the left side, the inferior epigastric vessels were exposed and kept superior to the dissection planes at all times during the operation. The hernia sac was identified and cord structures were preserved during the dissection and reduction of hernia sac. The cord structures were dissected free from the hernia sac. Cord lipoma was identified and meticulously dissected off of the cord structures. Hemostasis was maintained. Once dissection was completed, a 15 cm x 9 cm piece Progrip mesh was rolled double scroll.  It was placed within the peritoneum flap and centered over the internal inguinal ring to cover the indirect, direct and femoral hernia spaces, this mesh also overlaps the pubic tubercle by 2cm medially and inferiorly. It was noted to lay flat, in good position and without crimpage. The peritoneum was closed with running absorbable 3-0 V lock suture. On the right side, similar peritoneal pocket was created, the incarcerated preperitoneal fat was reduced from the direct hernia space. There was an abnormal amount of scarring around the cord structures, these were freed in order to make space for the hernia mesh. The hernia sac in the direct space was imbricated with 3-0 absorbable vlok suture in order to reduce the expected seroma in the postoperative period. 15 cm x 9 cm piece Progrip mesh was rolled double scroll. It was placed within the peritoneum flap and centered over the internal inguinal ring to cover the indirect, direct and femoral hernia spaces, this mesh also overlaps the pubic tubercle by 2cm medially and inferiorly. It was noted to lay flat, in good position and without crimpage. The peritoneum was closed with running absorbable 3-0 V lock suture. Robot was undocked without complications. The 8mm robotic ports were removed under direct visualization. No bleeding was noted. Skin incisions were closed with subcuticular 4-0 Monocryl suture. Combination of 1% lidocaine and 0.25% marcaine without epinephrine was used to anesthetize the skin incisions. Incisions were covered with mastisol/steri-strips. Pt tolerated the procedure well. There were no complications. All instruments and sponges were accounted for. Bilateral testes were present in the scrotum at the end of the case. Patient was extubated and taken to recovery room in stable condition.        Electronically signed by Valente Gastelum DO on 5/6/2022 at 7:30 PM

## 2022-05-11 DIAGNOSIS — M16.11 PRIMARY OSTEOARTHRITIS OF RIGHT HIP: ICD-10-CM

## 2022-05-11 RX ORDER — HYDROCODONE BITARTRATE AND ACETAMINOPHEN 10; 325 MG/1; MG/1
1 TABLET ORAL EVERY 6 HOURS PRN
Qty: 120 TABLET | Refills: 0 | Status: SHIPPED | OUTPATIENT
Start: 2022-05-11 | End: 2022-06-17 | Stop reason: SDUPTHER

## 2022-05-23 ENCOUNTER — OFFICE VISIT (OUTPATIENT)
Dept: SURGERY | Age: 77
End: 2022-05-23

## 2022-05-23 VITALS — WEIGHT: 175 LBS | HEIGHT: 72 IN | BODY MASS INDEX: 23.7 KG/M2

## 2022-05-23 DIAGNOSIS — Z87.19 STATUS POST INGUINAL HERNIA REPAIR: Primary | ICD-10-CM

## 2022-05-23 DIAGNOSIS — Z98.890 STATUS POST INGUINAL HERNIA REPAIR: Primary | ICD-10-CM

## 2022-05-23 PROCEDURE — 99024 POSTOP FOLLOW-UP VISIT: CPT | Performed by: SURGERY

## 2022-05-23 NOTE — LETTER
[facility]  Surgical Specialties - General Surgery  [address]  [facility], [facility] [address]  Phone: [phone]  Fax: [phone]      22    Patient: Ondina Nunez  MRN: [id number]  : 1945  Date of visit: 2022    Dear Mary Jo Guido MD:      I saw Ondina Nunez in follow up to his robot bilateral inguinal hernia repair, he is doing well. Below are the relevant portions of my assessment and plan of care. If you have questions, please do not hesitate to call me. I look forward to following Ondina Nunez along with you.     Sincerely,        Oren Sender, DO

## 2022-05-23 NOTE — PROGRESS NOTES
[facility]  Progress Note    PATIENT NAME: Peter Woods     CLINIC VISIT DATE: 5/23/2022    SUBJECTIVE:  Peter Woods is a 65 y.o. male who presents to the clinic today for follow up to robot bilateral inguinal hernia repair performed 5/6/22. No new issues since surgery, pt is tolerating regular diet and having normal BM. OBJECTIVE:    Ht 6' (1.829 m)   Wt 175 lb (79.4 kg)   BMI 23.73 kg/m²     General Appearance:  awake, alert, no acute distress, well developed, well nourished   Skin:  Skin color, texture, turgor normal. No rashes or lesions. Lungs:  Normal chest expansion, unlabored breathing without accessory muscle use. No audible rales, rhonchi, or wheezing. Cardiovascular: S1S2. No evidence of JVD. No evidence of pulsatile masses in abdomen  Abdomen:  Soft, non-tender, no organomegaly, no masses. Incisions C/D/I without evidence of bleeding/infection  Musculoskeletal: No evidence of bony/muscular deformities, trauma, atrophy of either left/right upper/lower extremity. No evidence of digital clubbing or cyanosis. ASSESSMENT:  1. Status post inguinal hernia repair          PLAN:  1. Lifting restriction to less than 20lbs for the next 4 weeks, unrestricted after this.   2. F/U prn    Electronically signed by aYw Hogan DO on 5/23/2022 at 4:46 PM

## 2022-06-17 DIAGNOSIS — M16.11 PRIMARY OSTEOARTHRITIS OF RIGHT HIP: ICD-10-CM

## 2022-06-17 RX ORDER — HYDROCODONE BITARTRATE AND ACETAMINOPHEN 10; 325 MG/1; MG/1
1 TABLET ORAL EVERY 6 HOURS PRN
Qty: 120 TABLET | Refills: 0 | Status: SHIPPED | OUTPATIENT
Start: 2022-06-17 | End: 2022-08-08 | Stop reason: SDUPTHER

## 2022-08-08 DIAGNOSIS — M16.11 PRIMARY OSTEOARTHRITIS OF RIGHT HIP: ICD-10-CM

## 2022-08-08 RX ORDER — HYDROCODONE BITARTRATE AND ACETAMINOPHEN 10; 325 MG/1; MG/1
1 TABLET ORAL EVERY 6 HOURS PRN
Qty: 120 TABLET | Refills: 0 | Status: SHIPPED | OUTPATIENT
Start: 2022-08-08 | End: 2022-09-07 | Stop reason: SDUPTHER

## 2022-09-02 RX ORDER — ALLOPURINOL 300 MG/1
TABLET ORAL
Qty: 90 TABLET | Refills: 0 | Status: SHIPPED | OUTPATIENT
Start: 2022-09-02

## 2022-09-07 DIAGNOSIS — M16.11 PRIMARY OSTEOARTHRITIS OF RIGHT HIP: ICD-10-CM

## 2022-09-07 RX ORDER — HYDROCODONE BITARTRATE AND ACETAMINOPHEN 10; 325 MG/1; MG/1
1 TABLET ORAL EVERY 6 HOURS PRN
Qty: 120 TABLET | Refills: 0 | Status: SHIPPED | OUTPATIENT
Start: 2022-09-07 | End: 2022-10-20 | Stop reason: SDUPTHER

## 2022-09-07 NOTE — TELEPHONE ENCOUNTER
Pt came in requesting a refill of his Hydrocodone-acetaminaphen. Pt still uses [address] on [address] [facility] in [place], [place].

## 2022-10-10 DIAGNOSIS — M16.11 PRIMARY OSTEOARTHRITIS OF RIGHT HIP: ICD-10-CM

## 2022-10-10 RX ORDER — HYDROCODONE BITARTRATE AND ACETAMINOPHEN 10; 325 MG/1; MG/1
1 TABLET ORAL EVERY 6 HOURS PRN
Qty: 120 TABLET | Refills: 0 | OUTPATIENT
Start: 2022-10-10 | End: 2022-11-09

## 2022-10-20 DIAGNOSIS — M16.11 PRIMARY OSTEOARTHRITIS OF RIGHT HIP: ICD-10-CM

## 2022-10-20 RX ORDER — HYDROCODONE BITARTRATE AND ACETAMINOPHEN 10; 325 MG/1; MG/1
1 TABLET ORAL EVERY 6 HOURS PRN
Qty: 120 TABLET | Refills: 0 | Status: SHIPPED | OUTPATIENT
Start: 2022-10-20 | End: 2022-11-19

## 2022-11-15 ENCOUNTER — OFFICE VISIT (OUTPATIENT)
Dept: PRIMARY CARE CLINIC | Age: 77
End: 2022-11-15
Payer: MEDICARE

## 2022-11-15 VITALS
HEART RATE: 77 BPM | OXYGEN SATURATION: 92 % | WEIGHT: 182 LBS | HEIGHT: 72 IN | SYSTOLIC BLOOD PRESSURE: 118 MMHG | DIASTOLIC BLOOD PRESSURE: 72 MMHG | BODY MASS INDEX: 24.65 KG/M2

## 2022-11-15 DIAGNOSIS — I10 ESSENTIAL (PRIMARY) HYPERTENSION: ICD-10-CM

## 2022-11-15 DIAGNOSIS — I47.1 PSVT (PAROXYSMAL SUPRAVENTRICULAR TACHYCARDIA) (HCC): ICD-10-CM

## 2022-11-15 DIAGNOSIS — Z23 NEED FOR VACCINATION: ICD-10-CM

## 2022-11-15 DIAGNOSIS — Z79.899 HIGH RISK MEDICATION USE: ICD-10-CM

## 2022-11-15 DIAGNOSIS — G89.29 CHRONIC LOW BACK PAIN WITHOUT SCIATICA, UNSPECIFIED BACK PAIN LATERALITY: Primary | ICD-10-CM

## 2022-11-15 DIAGNOSIS — M54.50 CHRONIC LOW BACK PAIN WITHOUT SCIATICA, UNSPECIFIED BACK PAIN LATERALITY: Primary | ICD-10-CM

## 2022-11-15 LAB
ALCOHOL URINE: NORMAL
AMPHETAMINE SCREEN, URINE: NEGATIVE
BARBITURATE SCREEN, URINE: NEGATIVE
BENZODIAZEPINE SCREEN, URINE: NEGATIVE
BUPRENORPHINE URINE: NEGATIVE
COCAINE METABOLITE SCREEN URINE: NEGATIVE
FENTANYL SCREEN, URINE: NORMAL
GABAPENTIN SCREEN, URINE: NORMAL
MDMA URINE: NEGATIVE
METHADONE SCREEN, URINE: NEGATIVE
METHAMPHETAMINE, URINE: NEGATIVE
OPIATE SCREEN URINE: POSITIVE
OXYCODONE SCREEN URINE: NEGATIVE
PHENCYCLIDINE SCREEN URINE: NEGATIVE
PROPOXYPHENE SCREEN, URINE: NEGATIVE
SYNTHETIC CANNABINOIDS(K2) SCREEN, URINE: NORMAL
THC SCREEN, URINE: NEGATIVE
TRAMADOL SCREEN URINE: NORMAL
TRICYCLIC ANTIDEPRESSANTS, UR: NEGATIVE

## 2022-11-15 PROCEDURE — 3078F DIAST BP <80 MM HG: CPT | Performed by: FAMILY MEDICINE

## 2022-11-15 PROCEDURE — G8427 DOCREV CUR MEDS BY ELIG CLIN: HCPCS | Performed by: FAMILY MEDICINE

## 2022-11-15 PROCEDURE — G8484 FLU IMMUNIZE NO ADMIN: HCPCS | Performed by: FAMILY MEDICINE

## 2022-11-15 PROCEDURE — 80305 DRUG TEST PRSMV DIR OPT OBS: CPT | Performed by: FAMILY MEDICINE

## 2022-11-15 PROCEDURE — 1123F ACP DISCUSS/DSCN MKR DOCD: CPT | Performed by: FAMILY MEDICINE

## 2022-11-15 PROCEDURE — 90694 VACC AIIV4 NO PRSRV 0.5ML IM: CPT | Performed by: FAMILY MEDICINE

## 2022-11-15 PROCEDURE — G0008 ADMIN INFLUENZA VIRUS VAC: HCPCS | Performed by: FAMILY MEDICINE

## 2022-11-15 PROCEDURE — 99213 OFFICE O/P EST LOW 20 MIN: CPT | Performed by: FAMILY MEDICINE

## 2022-11-15 PROCEDURE — G8420 CALC BMI NORM PARAMETERS: HCPCS | Performed by: FAMILY MEDICINE

## 2022-11-15 PROCEDURE — 3074F SYST BP LT 130 MM HG: CPT | Performed by: FAMILY MEDICINE

## 2022-11-15 PROCEDURE — 1036F TOBACCO NON-USER: CPT | Performed by: FAMILY MEDICINE

## 2022-11-15 SDOH — ECONOMIC STABILITY: FOOD INSECURITY: WITHIN THE PAST 12 MONTHS, THE FOOD YOU BOUGHT JUST DIDN'T LAST AND YOU DIDN'T HAVE MONEY TO GET MORE.: NEVER TRUE

## 2022-11-15 SDOH — ECONOMIC STABILITY: FOOD INSECURITY: WITHIN THE PAST 12 MONTHS, YOU WORRIED THAT YOUR FOOD WOULD RUN OUT BEFORE YOU GOT MONEY TO BUY MORE.: NEVER TRUE

## 2022-11-15 ASSESSMENT — ENCOUNTER SYMPTOMS
RHINORRHEA: 0
COUGH: 0
SHORTNESS OF BREATH: 0
EYE DISCHARGE: 0
NAUSEA: 0
ABDOMINAL PAIN: 0
BACK PAIN: 1
VOMITING: 0
DIARRHEA: 0
EYE REDNESS: 0
SORE THROAT: 0
WHEEZING: 0

## 2022-11-15 ASSESSMENT — SOCIAL DETERMINANTS OF HEALTH (SDOH): HOW HARD IS IT FOR YOU TO PAY FOR THE VERY BASICS LIKE FOOD, HOUSING, MEDICAL CARE, AND HEATING?: NOT HARD AT ALL

## 2022-11-15 NOTE — PROGRESS NOTES
[address] [facility] PRIMARY CARE  [address] [facility] [address]  Dept: [phone]    Bakari Zarate is a 65 y.o. male Established patient, who presents today for his medical conditions/complaints as noted below. Chief Complaint   Patient presents with    Back Pain       HPI:     HPI  Pt states still with back pain. States has a bad disc, and nogthing else can be done. No chest pain or sob. No fever or chill. Had recent URI. Patient states needs to use his Norco at least 3 or 4 times a day. Patient stated seeing pain management in the past and was not helpful. Patient not interested in surgery. Offered referral back to pain management for second opinion but patient declined. Patient denies any rapid beating of the heart. Has been taking his metoprolol. Pain is expressed mostly in the upper lumbar region. Patient states also does get some right hip pain. Patient is status post right hip replacement.     Reviewed prior notes None  Reviewed previous Labs    LDL Cholesterol (mg/dL)   Date Value   06/02/2021 60   02/27/2020 69     LDL Calculated (mg/dL)   Date Value   02/27/2020 69   10/25/2017 60   08/05/2016 46       (goal LDL is <100)   AST (U/L)   Date Value   06/02/2021 45 (H)     ALT (U/L)   Date Value   06/02/2021 49     BUN (mg/dL)   Date Value   04/27/2022 17     Hemoglobin A1C (%)   Date Value   04/13/2022 5.3     BP Readings from Last 3 Encounters:   11/15/22 118/72   05/06/22 133/76   05/06/22 (!) 142/84          (goal 120/80)    Past Medical History:   Diagnosis Date    COVID 12/22/2021    History of PSVT (paroxysmal supraventricular tachycardia) 5/6/2016    Low back pain     UTI (urinary tract infection)     Uvulitis     Vasodepressor syncope 5/6/2016    Wound, open, face       Past Surgical History:   Procedure Laterality Date    COLONOSCOPY      HERNIA REPAIR Bilateral     BILATERAL INGUINAL HERNIA REPAIR LAPAROSCOPIC ROBOTIC WITH possible MESH - Bilateral    HERNIA REPAIR Bilateral 2022    LAPAROSCOPIC ROBOTIC BILATERAL INGUINAL HERNIA REPAIR WITH  MESH performed by Ora Rodriguez DO at [address] Right 2019       Family History   Problem Relation Age of Onset    Cancer Mother     Diabetes Mother     Alcohol Abuse Father        Social History     Tobacco Use    Smoking status: Former     Packs/day: 2.00     Years: 15.00     Pack years: 30.00     Types: Cigarettes     Quit date: 1976     Years since quittin.9    Smokeless tobacco: Never   Substance Use Topics    Alcohol use: Yes     Alcohol/week: 0.0 standard drinks     Comment: social      Current Outpatient Medications   Medication Sig Dispense Refill    HYDROcodone-acetaminophen (NORCO)  MG per tablet Take 1 tablet by mouth every 6 hours as needed for Pain for up to 30 days. 120 tablet 0    allopurinol (ZYLOPRIM) 300 MG tablet Take 1 tablet by mouth once daily 90 tablet 0    docusate sodium (COLACE) 100 MG capsule Take 1 capsule by mouth 2 times daily 20 capsule 0    aspirin 81 MG EC tablet Take 81 mg by mouth daily      diphenhydrAMINE (BENADRYL) 25 MG capsule Take 25 mg by mouth every 6 hours as needed for Itching      metoprolol succinate (TOPROL XL) 25 MG extended release tablet Take 25 mg by mouth daily . 5mg daily      sildenafil (VIAGRA) 100 MG tablet Take 100 mg by mouth as needed for Erectile Dysfunction      senna (SENOKOT) 8.6 MG tablet Take 1 tablet by mouth daily       No current facility-administered medications for this visit.      No Known Allergies    Health Maintenance   Topic Date Due    COVID-19 Vaccine (1) Never done    Hepatitis C screen  Never done    Shingles vaccine (1 of 2) Never done    Flu vaccine (1) 2022    Depression Screen  2023    Annual Wellness Visit (AWV)  2023    DTaP/Tdap/Td vaccine (2 - Td or Tdap) 2025    Pneumococcal 65+ years Vaccine  Completed    Hepatitis A vaccine  Aged Out    Hib vaccine  Aged Out    Meningococcal (ACWY) vaccine  Aged Out       Subjective:      Review of Systems   Constitutional:  Negative for chills and fever. HENT:  Negative for rhinorrhea and sore throat. Eyes:  Negative for discharge and redness. Respiratory:  Negative for cough, shortness of breath and wheezing. Cardiovascular:  Negative for chest pain and palpitations. Gastrointestinal:  Negative for abdominal pain, diarrhea, nausea and vomiting. Genitourinary:  Negative for dysuria and frequency. Musculoskeletal:  Positive for back pain. Negative for arthralgias and myalgias. Neurological:  Negative for dizziness, light-headedness and headaches. Psychiatric/Behavioral:  Negative for sleep disturbance. Objective:     /72   Pulse 77   Ht 6' (1.829 m)   Wt 182 lb (82.6 kg)   SpO2 92%   BMI 24.68 kg/m²   Physical Exam  Vitals and nursing note reviewed. Constitutional:       General: He is not in acute distress. Appearance: He is well-developed. He is not ill-appearing. HENT:      Head: Normocephalic and atraumatic. Right Ear: External ear normal.      Left Ear: External ear normal.   Eyes:      General: No scleral icterus. Right eye: No discharge. Left eye: No discharge. Conjunctiva/sclera: Conjunctivae normal.   Neck:      Thyroid: No thyromegaly. Trachea: No tracheal deviation. Cardiovascular:      Rate and Rhythm: Normal rate and regular rhythm. Heart sounds: Normal heart sounds. Pulmonary:      Effort: Pulmonary effort is normal. No respiratory distress. Breath sounds: Normal breath sounds. No wheezing. Lymphadenopathy:      Cervical: No cervical adenopathy. Skin:     General: Skin is warm. Findings: No rash. Neurological:      Mental Status: He is alert and oriented to person, place, and time. Psychiatric:         Mood and Affect: Mood normal.         Behavior: Behavior normal.         Thought Content:  Thought content normal.       Assessment:       Diagnosis Orders   1. Chronic low back pain without sciatica, unspecified back pain laterality        2. Need for vaccination  Influenza, FLUAD, (age 72 y+), IM, Preservative Free, 0.5 mL      3. High risk medication use  POCT Rapid Drug Screen      4. PSVT (paroxysmal supraventricular tachycardia) ([facility] [address]        5. Essential (primary) hypertension               Plan:   Flu shot today  Shingrix discussed  Urine drug screen  Continue present medications as is    Return in about 4 months (around 3/15/2023). Orders Placed This Encounter   Procedures    Influenza, FLUAD, (age 72 y+), IM, Preservative Free, 0.5 mL    POCT Rapid Drug Screen     No orders of the defined types were placed in this encounter. Patient given educational materials - see patient instructions. Discussed use, benefit, and side effects of prescribed medications. All patient questions answered. Pt voiced understanding. Reviewed health maintenance. Instructed to continue current medications, diet andexercise. Patient agreed with treatment plan. Follow up as directed.      Electronicallysigned by Arnulfo Luciano MD on 11/15/2022 at 10:02 AM

## 2022-11-28 DIAGNOSIS — M16.11 PRIMARY OSTEOARTHRITIS OF RIGHT HIP: ICD-10-CM

## 2022-11-28 RX ORDER — HYDROCODONE BITARTRATE AND ACETAMINOPHEN 10; 325 MG/1; MG/1
1 TABLET ORAL EVERY 6 HOURS PRN
Qty: 120 TABLET | Refills: 0 | Status: SHIPPED | OUTPATIENT
Start: 2022-11-28 | End: 2022-12-28

## 2022-12-01 RX ORDER — ALLOPURINOL 300 MG/1
TABLET ORAL
Qty: 90 TABLET | Refills: 0 | Status: SHIPPED | OUTPATIENT
Start: 2022-12-01

## 2022-12-27 DIAGNOSIS — M16.11 PRIMARY OSTEOARTHRITIS OF RIGHT HIP: ICD-10-CM

## 2022-12-27 RX ORDER — HYDROCODONE BITARTRATE AND ACETAMINOPHEN 10; 325 MG/1; MG/1
1 TABLET ORAL EVERY 6 HOURS PRN
Qty: 120 TABLET | Refills: 0 | Status: SHIPPED | OUTPATIENT
Start: 2022-12-27 | End: 2023-01-26

## 2023-01-18 DIAGNOSIS — M16.11 PRIMARY OSTEOARTHRITIS OF RIGHT HIP: ICD-10-CM

## 2023-01-18 RX ORDER — HYDROCODONE BITARTRATE AND ACETAMINOPHEN 10; 325 MG/1; MG/1
1 TABLET ORAL EVERY 6 HOURS PRN
Qty: 120 TABLET | Refills: 0 | Status: SHIPPED | OUTPATIENT
Start: 2023-01-18 | End: 2023-02-17

## 2023-02-24 DIAGNOSIS — M16.11 PRIMARY OSTEOARTHRITIS OF RIGHT HIP: ICD-10-CM

## 2023-02-24 RX ORDER — HYDROCODONE BITARTRATE AND ACETAMINOPHEN 10; 325 MG/1; MG/1
1 TABLET ORAL EVERY 6 HOURS PRN
Qty: 120 TABLET | Refills: 0 | Status: SHIPPED | OUTPATIENT
Start: 2023-02-24 | End: 2023-03-26

## 2023-03-28 ENCOUNTER — OFFICE VISIT (OUTPATIENT)
Dept: PRIMARY CARE CLINIC | Age: 78
End: 2023-03-28
Payer: MEDICARE

## 2023-03-28 VITALS
BODY MASS INDEX: 25.3 KG/M2 | DIASTOLIC BLOOD PRESSURE: 68 MMHG | HEART RATE: 82 BPM | OXYGEN SATURATION: 96 % | WEIGHT: 186.8 LBS | HEIGHT: 72 IN | SYSTOLIC BLOOD PRESSURE: 110 MMHG

## 2023-03-28 DIAGNOSIS — I77.1 ARTERIAL INSUFFICIENCY (HCC): ICD-10-CM

## 2023-03-28 DIAGNOSIS — Z13.6 ENCOUNTER FOR LIPID SCREENING FOR CARDIOVASCULAR DISEASE: Primary | ICD-10-CM

## 2023-03-28 DIAGNOSIS — M16.11 PRIMARY OSTEOARTHRITIS OF RIGHT HIP: ICD-10-CM

## 2023-03-28 DIAGNOSIS — Z12.5 SCREENING PSA (PROSTATE SPECIFIC ANTIGEN): ICD-10-CM

## 2023-03-28 DIAGNOSIS — Z00.00 ANNUAL PHYSICAL EXAM: ICD-10-CM

## 2023-03-28 DIAGNOSIS — I47.1 PSVT (PAROXYSMAL SUPRAVENTRICULAR TACHYCARDIA) (HCC): ICD-10-CM

## 2023-03-28 DIAGNOSIS — K40.20 NON-RECURRENT BILATERAL INGUINAL HERNIA WITHOUT OBSTRUCTION OR GANGRENE: ICD-10-CM

## 2023-03-28 DIAGNOSIS — Z13.220 ENCOUNTER FOR LIPID SCREENING FOR CARDIOVASCULAR DISEASE: Primary | ICD-10-CM

## 2023-03-28 DIAGNOSIS — M10.00 IDIOPATHIC GOUT, UNSPECIFIED CHRONICITY, UNSPECIFIED SITE: ICD-10-CM

## 2023-03-28 PROCEDURE — G0439 PPPS, SUBSEQ VISIT: HCPCS | Performed by: FAMILY MEDICINE

## 2023-03-28 PROCEDURE — 1123F ACP DISCUSS/DSCN MKR DOCD: CPT | Performed by: FAMILY MEDICINE

## 2023-03-28 PROCEDURE — 3078F DIAST BP <80 MM HG: CPT | Performed by: FAMILY MEDICINE

## 2023-03-28 PROCEDURE — G8484 FLU IMMUNIZE NO ADMIN: HCPCS | Performed by: FAMILY MEDICINE

## 2023-03-28 PROCEDURE — 3074F SYST BP LT 130 MM HG: CPT | Performed by: FAMILY MEDICINE

## 2023-03-28 RX ORDER — HYDROCODONE BITARTRATE AND ACETAMINOPHEN 10; 325 MG/1; MG/1
1 TABLET ORAL EVERY 6 HOURS PRN
Qty: 120 TABLET | Refills: 0 | Status: SHIPPED | OUTPATIENT
Start: 2023-03-28 | End: 2023-04-27

## 2023-03-28 RX ORDER — ALLOPURINOL 300 MG/1
300 TABLET ORAL DAILY
Qty: 90 TABLET | Refills: 3 | Status: SHIPPED | OUTPATIENT
Start: 2023-03-28

## 2023-03-28 RX ORDER — METOPROLOL SUCCINATE 25 MG/1
12.5 TABLET, EXTENDED RELEASE ORAL DAILY
Qty: 45 TABLET | Refills: 3
Start: 2023-03-28

## 2023-03-28 RX ORDER — HYDROCODONE BITARTRATE AND ACETAMINOPHEN 5; 325 MG/1; MG/1
1 TABLET ORAL EVERY 6 HOURS PRN
Qty: 15 TABLET | Refills: 0 | Status: CANCELLED | OUTPATIENT
Start: 2023-03-28 | End: 2023-04-04

## 2023-03-28 SDOH — ECONOMIC STABILITY: FOOD INSECURITY: WITHIN THE PAST 12 MONTHS, YOU WORRIED THAT YOUR FOOD WOULD RUN OUT BEFORE YOU GOT MONEY TO BUY MORE.: NEVER TRUE

## 2023-03-28 SDOH — ECONOMIC STABILITY: INCOME INSECURITY: HOW HARD IS IT FOR YOU TO PAY FOR THE VERY BASICS LIKE FOOD, HOUSING, MEDICAL CARE, AND HEATING?: NOT HARD AT ALL

## 2023-03-28 SDOH — ECONOMIC STABILITY: FOOD INSECURITY: WITHIN THE PAST 12 MONTHS, THE FOOD YOU BOUGHT JUST DIDN'T LAST AND YOU DIDN'T HAVE MONEY TO GET MORE.: NEVER TRUE

## 2023-03-28 SDOH — ECONOMIC STABILITY: HOUSING INSECURITY
IN THE LAST 12 MONTHS, WAS THERE A TIME WHEN YOU DID NOT HAVE A STEADY PLACE TO SLEEP OR SLEPT IN A SHELTER (INCLUDING NOW)?: NO

## 2023-03-28 ASSESSMENT — PATIENT HEALTH QUESTIONNAIRE - PHQ9
SUM OF ALL RESPONSES TO PHQ QUESTIONS 1-9: 0
SUM OF ALL RESPONSES TO PHQ9 QUESTIONS 1 & 2: 0
1. LITTLE INTEREST OR PLEASURE IN DOING THINGS: 0
SUM OF ALL RESPONSES TO PHQ QUESTIONS 1-9: 0
2. FEELING DOWN, DEPRESSED OR HOPELESS: 0

## 2023-03-28 ASSESSMENT — LIFESTYLE VARIABLES
HOW OFTEN DO YOU HAVE A DRINK CONTAINING ALCOHOL: NEVER
HOW MANY STANDARD DRINKS CONTAINING ALCOHOL DO YOU HAVE ON A TYPICAL DAY: PATIENT DOES NOT DRINK

## 2023-03-28 NOTE — PATIENT INSTRUCTIONS
0604-7679 mg of calcium and 4768-8705 IU of vitamin D per day. It is possible to meet your calcium requirement with diet alone, but a vitamin D supplement is usually necessary to meet this goal.  When exposed to the sun, use a sunscreen that protects against both UVA and UVB radiation with an SPF of 30 or greater. Reapply every 2 to 3 hours or after sweating, drying off with a towel, or swimming. Always wear a seat belt when traveling in a car. Always wear a helmet when riding a bicycle or motorcycle.

## 2023-03-28 NOTE — PROGRESS NOTES
Medicare Annual Wellness Visit    Nichole Primrose is here for Back Pain, Medicare AWV, and Hypertension    Assessment & Plan   Non-recurrent bilateral inguinal hernia without obstruction or gangrene  Arterial insufficiency (HCC)  PSVT (paroxysmal supraventricular tachycardia) ([facility] [address]    Recommendations for Preventive Services Due: see orders and patient instructions/AVS.  Recommended screening schedule for the next 5-10 years is provided to the patient in written form: see Patient Instructions/AVS.     No follow-ups on file. Subjective   The following acute and/or chronic problems were also addressed today:  PSVT    Patient's complete Health Risk Assessment and screening values have been reviewed and are found in Flowsheets. The following problems were reviewed today and where indicated follow up appointments were made and/or referrals ordered. Positive Risk Factor Screenings with Interventions:                Opioid Risk: (Low risk score <55) Opioid risk score: 3    Patient is low risk for opioid use disorder or overdose. Last PDMP Oswaldo Harman as Reviewed:  Review User Review Instant Review Result   Patrice Montano 11/15/2022  9:59 AM     Reviewed PDMP [1]     Last Controlled Substance Monitoring Documentation      [address] Office Visit from 4/13/2022 in [facility]   Periodic Controlled Substance Monitoring Possible medication side effects, risk of tolerance/dependence & alternative treatments discussed., No signs of potential drug abuse or diversion identified. filed at 04/13/2022 1033   Chronic Pain > 50 MEDD Obtained or confirmed \"Consent for Opioid Use\" on file. filed at 04/13/2022 1033   Chronic Pain > 120 MEDD Obtained or confirmed \"Medication Contract\" on file.  filed at 04/13/2022 1033                  Hearing Screen:  Do you or your family notice any trouble with your hearing that hasn't been managed with hearing aids?: (!) Yes    Interventions:  Patient declines any further

## 2023-04-03 RX ORDER — SILDENAFIL 100 MG/1
100 TABLET, FILM COATED ORAL PRN
Qty: 30 TABLET | Refills: 1 | Status: SHIPPED | OUTPATIENT
Start: 2023-04-03

## 2023-05-05 DIAGNOSIS — M16.11 PRIMARY OSTEOARTHRITIS OF RIGHT HIP: ICD-10-CM

## 2023-05-05 RX ORDER — HYDROCODONE BITARTRATE AND ACETAMINOPHEN 10; 325 MG/1; MG/1
1 TABLET ORAL EVERY 6 HOURS PRN
Qty: 120 TABLET | Refills: 0 | Status: SHIPPED | OUTPATIENT
Start: 2023-05-05 | End: 2023-06-04

## 2023-05-05 NOTE — TELEPHONE ENCOUNTER
LAST VISIT:   3/28/2023     Future Appointments   Date Time Provider Luis Armando Hampton   7/25/2023  9:30 AM MD KARINA Obrien

## 2023-05-26 DIAGNOSIS — M16.11 PRIMARY OSTEOARTHRITIS OF RIGHT HIP: ICD-10-CM

## 2023-05-26 RX ORDER — HYDROCODONE BITARTRATE AND ACETAMINOPHEN 10; 325 MG/1; MG/1
1 TABLET ORAL EVERY 6 HOURS PRN
Qty: 120 TABLET | Refills: 0 | Status: SHIPPED | OUTPATIENT
Start: 2023-05-26 | End: 2023-06-25

## 2023-06-21 ENCOUNTER — TELEPHONE (OUTPATIENT)
Dept: PRIMARY CARE CLINIC | Age: 78
End: 2023-06-21

## 2023-06-21 NOTE — TELEPHONE ENCOUNTER
Pt's spouse would like you to call pt and get him in for an appt, without telling him that she called. Concerned that he is having memory issues. Pt keeps repeating himself and not doing task that he was able to do 10 yrs ago. Stated that dementia, or Alzheimer's runs on his side.     [phone]

## 2023-06-23 NOTE — TELEPHONE ENCOUNTER
Left message for patients wife to call office back. Per Maynor we can add to patients note in July for memory to be addressed per wife, but we can not do what she is asking without the patients knowledge.

## 2023-07-03 DIAGNOSIS — M16.11 PRIMARY OSTEOARTHRITIS OF RIGHT HIP: ICD-10-CM

## 2023-07-05 RX ORDER — HYDROCODONE BITARTRATE AND ACETAMINOPHEN 10; 325 MG/1; MG/1
1 TABLET ORAL EVERY 6 HOURS PRN
Qty: 120 TABLET | Refills: 0 | Status: SHIPPED | OUTPATIENT
Start: 2023-07-05 | End: 2023-08-04

## 2023-07-25 ENCOUNTER — OFFICE VISIT (OUTPATIENT)
Dept: PRIMARY CARE CLINIC | Age: 78
End: 2023-07-25
Payer: MEDICARE

## 2023-07-25 VITALS
WEIGHT: 177 LBS | HEART RATE: 80 BPM | DIASTOLIC BLOOD PRESSURE: 80 MMHG | OXYGEN SATURATION: 98 % | SYSTOLIC BLOOD PRESSURE: 128 MMHG | BODY MASS INDEX: 24.01 KG/M2

## 2023-07-25 DIAGNOSIS — M54.50 CHRONIC LOW BACK PAIN WITHOUT SCIATICA, UNSPECIFIED BACK PAIN LATERALITY: ICD-10-CM

## 2023-07-25 DIAGNOSIS — I10 ESSENTIAL (PRIMARY) HYPERTENSION: Primary | ICD-10-CM

## 2023-07-25 DIAGNOSIS — G89.29 CHRONIC LOW BACK PAIN WITHOUT SCIATICA, UNSPECIFIED BACK PAIN LATERALITY: ICD-10-CM

## 2023-07-25 PROBLEM — U07.1 COVID: Status: RESOLVED | Noted: 2021-12-22 | Resolved: 2023-07-25

## 2023-07-25 PROCEDURE — 1123F ACP DISCUSS/DSCN MKR DOCD: CPT | Performed by: FAMILY MEDICINE

## 2023-07-25 PROCEDURE — 3079F DIAST BP 80-89 MM HG: CPT | Performed by: FAMILY MEDICINE

## 2023-07-25 PROCEDURE — 1036F TOBACCO NON-USER: CPT | Performed by: FAMILY MEDICINE

## 2023-07-25 PROCEDURE — 99213 OFFICE O/P EST LOW 20 MIN: CPT | Performed by: FAMILY MEDICINE

## 2023-07-25 PROCEDURE — 3074F SYST BP LT 130 MM HG: CPT | Performed by: FAMILY MEDICINE

## 2023-07-25 PROCEDURE — G8427 DOCREV CUR MEDS BY ELIG CLIN: HCPCS | Performed by: FAMILY MEDICINE

## 2023-07-25 PROCEDURE — G8420 CALC BMI NORM PARAMETERS: HCPCS | Performed by: FAMILY MEDICINE

## 2023-07-25 ASSESSMENT — ENCOUNTER SYMPTOMS
NAUSEA: 0
ABDOMINAL PAIN: 0
COUGH: 0
SHORTNESS OF BREATH: 0
SORE THROAT: 0
RHINORRHEA: 0
EYE REDNESS: 0
DIARRHEA: 0
WHEEZING: 0
EYE DISCHARGE: 0
VOMITING: 0

## 2023-07-25 NOTE — PROGRESS NOTES
Diabetes Mother     Alcohol Abuse Father        Social History     Tobacco Use    Smoking status: Former     Packs/day: 2.00     Years: 15.00     Pack years: 30.00     Types: Cigarettes     Quit date: 1976     Years since quittin.5    Smokeless tobacco: Never   Substance Use Topics    Alcohol use: Yes     Alcohol/week: 0.0 standard drinks     Comment: social      Current Outpatient Medications   Medication Sig Dispense Refill    HYDROcodone-acetaminophen (NORCO)  MG per tablet Take 1 tablet by mouth every 6 hours as needed for Pain for up to 30 days. 120 tablet 0    sildenafil (VIAGRA) 100 MG tablet Take 1 tablet by mouth as needed for Erectile Dysfunction 30 tablet 1    allopurinol (ZYLOPRIM) 300 MG tablet Take 1 tablet by mouth daily 90 tablet 3    metoprolol succinate (TOPROL XL) 25 MG extended release tablet Take 0.5 tablets by mouth daily . 5mg daily 45 tablet 3    docusate sodium (COLACE) 100 MG capsule Take 1 capsule by mouth 2 times daily 20 capsule 0    aspirin 81 MG EC tablet Take 1 tablet by mouth daily      diphenhydrAMINE (BENADRYL) 25 MG capsule Take 1 capsule by mouth every 6 hours as needed for Itching      senna (SENOKOT) 8.6 MG tablet Take 1 tablet by mouth daily       No current facility-administered medications for this visit. No Known Allergies    Health Maintenance   Topic Date Due    COVID-19 Vaccine (1) Never done    Hepatitis C screen  Never done    Flu vaccine (1) 2023    Depression Screen  2024    Annual Wellness Visit (AWV)  2024    DTaP/Tdap/Td vaccine (3 - Td or Tdap) 2033    Shingles vaccine  Completed    Pneumococcal 65+ years Vaccine  Completed    Hepatitis A vaccine  Aged Out    Hib vaccine  Aged Out    Meningococcal (ACWY) vaccine  Aged Out       Subjective:      Review of Systems   Constitutional:  Negative for chills and fever. HENT:  Negative for rhinorrhea and sore throat. Eyes:  Negative for discharge and redness.    Respiratory:

## 2023-07-27 LAB
A/G RATIO: NORMAL
ALBUMIN SERPL-MCNC: NORMAL G/DL
ALP BLD-CCNC: NORMAL U/L
ALT SERPL-CCNC: NORMAL U/L
ANION GAP SERPL CALCULATED.3IONS-SCNC: NORMAL MMOL/L
AST SERPL-CCNC: NORMAL U/L
BILIRUB SERPL-MCNC: NORMAL MG/DL
BILIRUBIN DIRECT: NORMAL
BILIRUBIN, INDIRECT: NORMAL
BUN BLDV-MCNC: NORMAL MG/DL
BUN/CREAT BLD: NORMAL
CALCIUM SERPL-MCNC: NORMAL MG/DL
CHLORIDE BLD-SCNC: NORMAL MMOL/L
CHOLESTEROL, FASTING: 109
CO2: NORMAL
CREAT SERPL-MCNC: NORMAL MG/DL
GFR AFRICAN AMERICAN: NORMAL
GFR NON-AFRICAN AMERICAN: NORMAL
GLOBULIN: NORMAL
GLUCOSE FASTING: 104 MG/DL
HDLC SERPL-MCNC: 31 MG/DL (ref 35–70)
LDL CHOLESTEROL CALCULATED: 64 MG/DL (ref 0–160)
POTASSIUM SERPL-SCNC: NORMAL MMOL/L
PROSTATE SPECIFIC ANTIGEN: NORMAL
PROTEIN TOTAL: NORMAL
SODIUM BLD-SCNC: NORMAL MMOL/L
TRIGLYCERIDE, FASTING: 72
URIC ACID: NORMAL

## 2023-07-27 RX ORDER — METOPROLOL SUCCINATE 25 MG/1
12.5 TABLET, EXTENDED RELEASE ORAL DAILY
Qty: 45 TABLET | Refills: 3 | Status: SHIPPED | OUTPATIENT
Start: 2023-07-27

## 2023-07-27 NOTE — TELEPHONE ENCOUNTER
LAST VISIT:   1/7/2022     Future Appointments   Date Time Provider [address]   11/21/2023  9:30 AM [facility]            Pt uses [address] [place] [address] in [facility].

## 2023-07-31 DIAGNOSIS — M16.11 PRIMARY OSTEOARTHRITIS OF RIGHT HIP: ICD-10-CM

## 2023-07-31 RX ORDER — HYDROCODONE BITARTRATE AND ACETAMINOPHEN 10; 325 MG/1; MG/1
1 TABLET ORAL EVERY 6 HOURS PRN
Qty: 120 TABLET | Refills: 0 | Status: SHIPPED | OUTPATIENT
Start: 2023-07-31 | End: 2023-08-30

## 2023-08-14 ENCOUNTER — OFFICE VISIT (OUTPATIENT)
Dept: PRIMARY CARE CLINIC | Age: 78
End: 2023-08-14
Payer: MEDICARE

## 2023-08-14 VITALS
OXYGEN SATURATION: 90 % | BODY MASS INDEX: 23.78 KG/M2 | SYSTOLIC BLOOD PRESSURE: 122 MMHG | HEIGHT: 72 IN | DIASTOLIC BLOOD PRESSURE: 72 MMHG | WEIGHT: 175.6 LBS | HEART RATE: 84 BPM

## 2023-08-14 DIAGNOSIS — F03.A0 MILD DEMENTIA WITHOUT BEHAVIORAL DISTURBANCE, PSYCHOTIC DISTURBANCE, MOOD DISTURBANCE, OR ANXIETY, UNSPECIFIED DEMENTIA TYPE (HCC): Primary | ICD-10-CM

## 2023-08-14 DIAGNOSIS — I10 ESSENTIAL (PRIMARY) HYPERTENSION: ICD-10-CM

## 2023-08-14 DIAGNOSIS — G89.29 CHRONIC LOW BACK PAIN WITHOUT SCIATICA, UNSPECIFIED BACK PAIN LATERALITY: ICD-10-CM

## 2023-08-14 DIAGNOSIS — M54.50 CHRONIC LOW BACK PAIN WITHOUT SCIATICA, UNSPECIFIED BACK PAIN LATERALITY: ICD-10-CM

## 2023-08-14 PROCEDURE — 1036F TOBACCO NON-USER: CPT | Performed by: FAMILY MEDICINE

## 2023-08-14 PROCEDURE — 3078F DIAST BP <80 MM HG: CPT | Performed by: FAMILY MEDICINE

## 2023-08-14 PROCEDURE — 3074F SYST BP LT 130 MM HG: CPT | Performed by: FAMILY MEDICINE

## 2023-08-14 PROCEDURE — G8420 CALC BMI NORM PARAMETERS: HCPCS | Performed by: FAMILY MEDICINE

## 2023-08-14 PROCEDURE — 99214 OFFICE O/P EST MOD 30 MIN: CPT | Performed by: FAMILY MEDICINE

## 2023-08-14 PROCEDURE — 1123F ACP DISCUSS/DSCN MKR DOCD: CPT | Performed by: FAMILY MEDICINE

## 2023-08-14 PROCEDURE — G8427 DOCREV CUR MEDS BY ELIG CLIN: HCPCS | Performed by: FAMILY MEDICINE

## 2023-08-14 ASSESSMENT — ENCOUNTER SYMPTOMS
NAUSEA: 0
BACK PAIN: 1
ABDOMINAL PAIN: 0
SHORTNESS OF BREATH: 0
EYE REDNESS: 0
EYE DISCHARGE: 0
VOMITING: 0
DIARRHEA: 0
WHEEZING: 0
COUGH: 0
RHINORRHEA: 0
SORE THROAT: 0

## 2023-08-14 NOTE — PROGRESS NOTES
[address] PRIMARY CARE  [address] [facility] [address]  Dept: [phone]    Sue Mancini is a 77 y.o. male Established patient, who presents today for his medical conditions/complaints as noted below. Chief Complaint   Patient presents with    Memory Loss     Wife states this has been happening for a while       HPI:     HPI  Patient here with concerns over his memory. Wife has been noticing has been getting somewhat worse. Having also short-term problems. Not having behavioral issues. Back pain stays unchanged. Use medication as prescribed. Blood work was reviewed. Cholesterol just in the ideal range. Blood sugar little elevated. PSA normal.  Patient denies any recent change in weight.     Reviewed prior notes None  Reviewed previous Labs    LDL Cholesterol (mg/dL)   Date Value   06/02/2021 60   02/27/2020 69     LDL Calculated (mg/dL)   Date Value   02/27/2020 69   10/25/2017 60   08/05/2016 46       (goal LDL is <100)   AST (U/L)   Date Value   06/02/2021 45 (H)     ALT (U/L)   Date Value   06/02/2021 49     BUN (mg/dL)   Date Value   04/27/2022 17     Hemoglobin A1C (%)   Date Value   04/13/2022 5.3     BP Readings from Last 3 Encounters:   08/14/23 122/72   07/25/23 128/80   03/28/23 110/68          (goal 120/80)    Past Medical History:   Diagnosis Date    COVID 12/22/2021    History of PSVT (paroxysmal supraventricular tachycardia) 5/6/2016    Low back pain     UTI (urinary tract infection)     Uvulitis     Vasodepressor syncope 5/6/2016    Wound, open, face       Past Surgical History:   Procedure Laterality Date    COLONOSCOPY      HERNIA REPAIR Bilateral     BILATERAL INGUINAL HERNIA REPAIR LAPAROSCOPIC ROBOTIC WITH possible MESH - Bilateral    HERNIA REPAIR Bilateral 5/6/2022    LAPAROSCOPIC ROBOTIC BILATERAL INGUINAL HERNIA REPAIR WITH  MESH performed by Marcio Sequeira DO at [address] [facility] Right 01/07/2019

## 2023-08-17 DIAGNOSIS — Z12.5 SCREENING PSA (PROSTATE SPECIFIC ANTIGEN): ICD-10-CM

## 2023-08-17 DIAGNOSIS — Z00.00 ANNUAL PHYSICAL EXAM: ICD-10-CM

## 2023-08-17 DIAGNOSIS — Z13.6 ENCOUNTER FOR LIPID SCREENING FOR CARDIOVASCULAR DISEASE: ICD-10-CM

## 2023-08-17 DIAGNOSIS — Z13.220 ENCOUNTER FOR LIPID SCREENING FOR CARDIOVASCULAR DISEASE: ICD-10-CM

## 2023-08-17 DIAGNOSIS — M10.00 IDIOPATHIC GOUT, UNSPECIFIED CHRONICITY, UNSPECIFIED SITE: ICD-10-CM

## 2023-08-28 DIAGNOSIS — M16.11 PRIMARY OSTEOARTHRITIS OF RIGHT HIP: ICD-10-CM

## 2023-08-28 RX ORDER — HYDROCODONE BITARTRATE AND ACETAMINOPHEN 10; 325 MG/1; MG/1
1 TABLET ORAL EVERY 6 HOURS PRN
Qty: 120 TABLET | Refills: 0 | Status: SHIPPED | OUTPATIENT
Start: 2023-08-28 | End: 2023-09-27

## 2023-08-28 NOTE — TELEPHONE ENCOUNTER
LAST VISIT:   8/14/2023     Future Appointments   Date Time Provider [address]   11/21/2023  9:30 AM MD KARINA Stubbs

## 2023-09-25 DIAGNOSIS — M16.11 PRIMARY OSTEOARTHRITIS OF RIGHT HIP: ICD-10-CM

## 2023-09-25 RX ORDER — HYDROCODONE BITARTRATE AND ACETAMINOPHEN 10; 325 MG/1; MG/1
1 TABLET ORAL EVERY 6 HOURS PRN
Qty: 120 TABLET | Refills: 0 | Status: SHIPPED | OUTPATIENT
Start: 2023-09-25 | End: 2023-10-25

## 2023-10-31 DIAGNOSIS — M16.11 PRIMARY OSTEOARTHRITIS OF RIGHT HIP: ICD-10-CM

## 2023-10-31 RX ORDER — HYDROCODONE BITARTRATE AND ACETAMINOPHEN 10; 325 MG/1; MG/1
1 TABLET ORAL EVERY 6 HOURS PRN
Qty: 120 TABLET | Refills: 0 | Status: SHIPPED | OUTPATIENT
Start: 2023-10-31 | End: 2023-11-30

## 2023-10-31 NOTE — TELEPHONE ENCOUNTER
LAST VISIT:   8/14/2023     Future Appointments   Date Time Provider [address]   11/21/2023  9:30 AM Sharee Bruner  [address] verified?  Yes    [facility] [address]  [address] [address]  Phone: [phone] Fax: [phone]

## 2023-11-21 ENCOUNTER — OFFICE VISIT (OUTPATIENT)
Dept: PRIMARY CARE CLINIC | Age: 78
End: 2023-11-21
Payer: MEDICARE

## 2023-11-21 VITALS
HEIGHT: 72 IN | WEIGHT: 181 LBS | BODY MASS INDEX: 24.52 KG/M2 | OXYGEN SATURATION: 98 % | DIASTOLIC BLOOD PRESSURE: 72 MMHG | HEART RATE: 88 BPM | SYSTOLIC BLOOD PRESSURE: 126 MMHG

## 2023-11-21 DIAGNOSIS — M16.11 PRIMARY OSTEOARTHRITIS OF RIGHT HIP: ICD-10-CM

## 2023-11-21 DIAGNOSIS — G89.29 CHRONIC LOW BACK PAIN WITHOUT SCIATICA, UNSPECIFIED BACK PAIN LATERALITY: Primary | ICD-10-CM

## 2023-11-21 DIAGNOSIS — I10 ESSENTIAL (PRIMARY) HYPERTENSION: ICD-10-CM

## 2023-11-21 DIAGNOSIS — Z79.899 HIGH RISK MEDICATION USE: ICD-10-CM

## 2023-11-21 DIAGNOSIS — M54.50 CHRONIC LOW BACK PAIN WITHOUT SCIATICA, UNSPECIFIED BACK PAIN LATERALITY: Primary | ICD-10-CM

## 2023-11-21 PROCEDURE — 1036F TOBACCO NON-USER: CPT | Performed by: FAMILY MEDICINE

## 2023-11-21 PROCEDURE — 3074F SYST BP LT 130 MM HG: CPT | Performed by: FAMILY MEDICINE

## 2023-11-21 PROCEDURE — 80305 DRUG TEST PRSMV DIR OPT OBS: CPT | Performed by: FAMILY MEDICINE

## 2023-11-21 PROCEDURE — 1123F ACP DISCUSS/DSCN MKR DOCD: CPT | Performed by: FAMILY MEDICINE

## 2023-11-21 PROCEDURE — G8420 CALC BMI NORM PARAMETERS: HCPCS | Performed by: FAMILY MEDICINE

## 2023-11-21 PROCEDURE — G8427 DOCREV CUR MEDS BY ELIG CLIN: HCPCS | Performed by: FAMILY MEDICINE

## 2023-11-21 PROCEDURE — 99214 OFFICE O/P EST MOD 30 MIN: CPT | Performed by: FAMILY MEDICINE

## 2023-11-21 PROCEDURE — G8484 FLU IMMUNIZE NO ADMIN: HCPCS | Performed by: FAMILY MEDICINE

## 2023-11-21 PROCEDURE — 3078F DIAST BP <80 MM HG: CPT | Performed by: FAMILY MEDICINE

## 2023-11-21 RX ORDER — HYDROCODONE BITARTRATE AND ACETAMINOPHEN 10; 325 MG/1; MG/1
1 TABLET ORAL EVERY 8 HOURS PRN
Qty: 90 TABLET | Refills: 0 | Status: SHIPPED | OUTPATIENT
Start: 2023-11-21 | End: 2023-12-21

## 2023-11-21 ASSESSMENT — ENCOUNTER SYMPTOMS
RHINORRHEA: 0
NAUSEA: 0
SORE THROAT: 0
ABDOMINAL PAIN: 0
BACK PAIN: 1
SHORTNESS OF BREATH: 0
DIARRHEA: 0
EYE DISCHARGE: 0
WHEEZING: 0
COUGH: 0
VOMITING: 0
EYE REDNESS: 0

## 2023-11-21 NOTE — PROGRESS NOTES
normal.         Behavior: Behavior normal.         Thought Content: Thought content normal.       Controlled Substance Monitoring:    Acute and Chronic Pain Monitoring:   RX Monitoring Periodic Controlled Substance Monitoring   11/21/2023   9:40 AM Possible medication side effects, risk of tolerance/dependence & alternative treatments discussed. ;No signs of potential drug abuse or diversion identified. ;Random urine drug screen sent today. Assessment:       Diagnosis Orders   1. Chronic low back pain without sciatica, unspecified back pain laterality  XR LUMBAR SPINE (2-3 VIEWS)    [address], DO, [facility]      2. High risk medication use  POCT Rapid Drug Screen      3. Essential (primary) hypertension        4. Primary osteoarthritis of right hip  HYDROcodone-acetaminophen (NORCO)  MG per tablet           Plan:    Decrease Norco to no more than 3 per day  Refer to pain management for second opinion  Xray lumbar spine   Urine drug screen today  In my opinion, pt is very functional with the pain medication, able to do his ADL's. No excessive sedation. Risk of addiction and tolerance discussed. Due to pressure from Medical Board, referred to pain management. Return in about 4 months (around 3/21/2024).     Orders Placed This Encounter   Procedures    XR LUMBAR SPINE (2-3 VIEWS)     Standing Status:   Future     Standing Expiration Date:   11/21/2024     Order Specific Question:   Reason for exam:     Answer:   low back pain    [address], DO, [facility]     Referral Priority:   Routine     Referral Type:   Eval and Treat     Referral Reason:   Specialty Services Required     Referred to Provider:   Mike Underwood DO     Number of Visits Requested:   1    POCT Rapid Drug Screen     Orders Placed This Encounter   Medications    HYDROcodone-acetaminophen (NORCO)  MG per tablet     Sig: Take 1 tablet by mouth every 8 hours as needed for

## 2023-12-08 ENCOUNTER — INITIAL CONSULT (OUTPATIENT)
Dept: PAIN MANAGEMENT | Age: 78
End: 2023-12-08
Payer: COMMERCIAL

## 2023-12-08 VITALS — HEIGHT: 72 IN | BODY MASS INDEX: 24.52 KG/M2 | WEIGHT: 181 LBS

## 2023-12-08 DIAGNOSIS — M79.18 MYOFASCIAL PAIN SYNDROME: ICD-10-CM

## 2023-12-08 DIAGNOSIS — G89.29 CHRONIC BILATERAL LOW BACK PAIN WITHOUT SCIATICA: Primary | ICD-10-CM

## 2023-12-08 DIAGNOSIS — Z79.891 CHRONIC USE OF OPIATE FOR THERAPEUTIC PURPOSE: ICD-10-CM

## 2023-12-08 DIAGNOSIS — M54.50 CHRONIC BILATERAL LOW BACK PAIN WITHOUT SCIATICA: Primary | ICD-10-CM

## 2023-12-08 PROCEDURE — 99204 OFFICE O/P NEW MOD 45 MIN: CPT | Performed by: STUDENT IN AN ORGANIZED HEALTH CARE EDUCATION/TRAINING PROGRAM

## 2023-12-08 PROCEDURE — 1123F ACP DISCUSS/DSCN MKR DOCD: CPT | Performed by: STUDENT IN AN ORGANIZED HEALTH CARE EDUCATION/TRAINING PROGRAM

## 2023-12-08 NOTE — PROGRESS NOTES
deferred    Behavioral Therapies:  -Continue daily stretching and home exercise program    Referrals:  -Offered physical therapy, patient deferred    Follow-up Plan:  -As needed    Patient was offered intervention where appropriate. Multi-modal Pain Therapy: The patient was explicitly considered for multimodal and interdisciplinary therapy. Non-opioid and non-pharmacological opportunities to enhance analgesia and quality of life have been and will continue to be pursued. Ny Vela,   Interventional Pain Management/[facility]   [facility]    No orders of the defined types were placed in this encounter.

## 2023-12-26 DIAGNOSIS — M16.11 PRIMARY OSTEOARTHRITIS OF RIGHT HIP: ICD-10-CM

## 2023-12-26 RX ORDER — HYDROCODONE BITARTRATE AND ACETAMINOPHEN 10; 325 MG/1; MG/1
1 TABLET ORAL EVERY 8 HOURS PRN
Qty: 90 TABLET | Refills: 0 | Status: SHIPPED | OUTPATIENT
Start: 2023-12-26 | End: 2024-01-25

## 2023-12-26 NOTE — TELEPHONE ENCOUNTER
LAST VISIT:   11/21/2023     Future Appointments   Date Time Provider [address]   3/19/2024  9:00 AM MD KARINA Verde

## 2024-01-30 DIAGNOSIS — M10.00 IDIOPATHIC GOUT, UNSPECIFIED CHRONICITY, UNSPECIFIED SITE: ICD-10-CM

## 2024-01-31 RX ORDER — ALLOPURINOL 300 MG/1
300 TABLET ORAL DAILY
Qty: 90 TABLET | Refills: 0 | Status: SHIPPED | OUTPATIENT
Start: 2024-01-31

## 2024-02-02 DIAGNOSIS — M47.816 LUMBAR SPONDYLOSIS: Primary | ICD-10-CM

## 2024-02-02 DIAGNOSIS — M16.11 PRIMARY OSTEOARTHRITIS OF RIGHT HIP: ICD-10-CM

## 2024-02-02 RX ORDER — HYDROCODONE BITARTRATE AND ACETAMINOPHEN 10; 325 MG/1; MG/1
1 TABLET ORAL EVERY 8 HOURS PRN
Qty: 90 TABLET | Refills: 0 | OUTPATIENT
Start: 2024-02-02 | End: 2024-03-03

## 2024-02-02 RX ORDER — HYDROCODONE BITARTRATE AND ACETAMINOPHEN 7.5; 325 MG/1; MG/1
1 TABLET ORAL EVERY 8 HOURS PRN
Qty: 90 TABLET | Refills: 0 | Status: SHIPPED | OUTPATIENT
Start: 2024-02-02 | End: 2024-03-03

## 2024-02-02 NOTE — TELEPHONE ENCOUNTER
LAST VISIT:   11/21/2023     Future Appointments   Date Time Provider Department Center   3/19/2024  9:00 AM Fernando Daniel MD [facility]       Pharmacy verified? Yes    [facility] [address] [place] [address] [phone] - F [phone] [address] [facility] [address]  Phone: [phone] Fax: [phone]

## 2024-02-02 NOTE — TELEPHONE ENCOUNTER
Advise patient per recommendation of pain management, need to decrease the Norco to 7.5 mg 3 times a day.  Prescription sent in.

## 2024-02-02 NOTE — TELEPHONE ENCOUNTER
Patient is very angry his dose has been decreased.  He voices understanding but is not happy.  He will try this dose and if not helping with his pain he will discuss at an appointment with Dr. Daniel.

## 2024-03-07 DIAGNOSIS — M47.816 LUMBAR SPONDYLOSIS: ICD-10-CM

## 2024-03-07 RX ORDER — HYDROCODONE BITARTRATE AND ACETAMINOPHEN 7.5; 325 MG/1; MG/1
1 TABLET ORAL EVERY 8 HOURS PRN
Qty: 90 TABLET | Refills: 0 | Status: SHIPPED | OUTPATIENT
Start: 2024-03-07 | End: 2024-04-06

## 2024-03-07 NOTE — TELEPHONE ENCOUNTER
LAST VISIT:   11/21/2023     Future Appointments   Date Time Provider Department Center   3/19/2024  9:00 AM Fernando Daniel MD [facility]       Pharmacy verified? Yes    [facility] [address] [place] [address] [phone] - F [phone] [address] [facility], [place] [address]  Phone: [phone] Fax: [phone]

## 2024-03-19 ENCOUNTER — OFFICE VISIT (OUTPATIENT)
Dept: PRIMARY CARE CLINIC | Age: 79
End: 2024-03-19
Payer: MEDICARE

## 2024-03-19 VITALS
BODY MASS INDEX: 25.35 KG/M2 | SYSTOLIC BLOOD PRESSURE: 128 MMHG | WEIGHT: 187.2 LBS | HEIGHT: 72 IN | HEART RATE: 92 BPM | OXYGEN SATURATION: 96 % | DIASTOLIC BLOOD PRESSURE: 74 MMHG

## 2024-03-19 DIAGNOSIS — I77.1 ARTERIAL INSUFFICIENCY (HCC): ICD-10-CM

## 2024-03-19 DIAGNOSIS — M54.50 CHRONIC LOW BACK PAIN WITHOUT SCIATICA, UNSPECIFIED BACK PAIN LATERALITY: Primary | ICD-10-CM

## 2024-03-19 DIAGNOSIS — I47.9 PAROXYSMAL TACHYCARDIA (HCC): ICD-10-CM

## 2024-03-19 DIAGNOSIS — F03.A0 MILD DEMENTIA WITHOUT BEHAVIORAL DISTURBANCE, PSYCHOTIC DISTURBANCE, MOOD DISTURBANCE, OR ANXIETY, UNSPECIFIED DEMENTIA TYPE (HCC): ICD-10-CM

## 2024-03-19 DIAGNOSIS — G89.29 CHRONIC LOW BACK PAIN WITHOUT SCIATICA, UNSPECIFIED BACK PAIN LATERALITY: Primary | ICD-10-CM

## 2024-03-19 PROCEDURE — 1123F ACP DISCUSS/DSCN MKR DOCD: CPT | Performed by: FAMILY MEDICINE

## 2024-03-19 PROCEDURE — G8427 DOCREV CUR MEDS BY ELIG CLIN: HCPCS | Performed by: FAMILY MEDICINE

## 2024-03-19 PROCEDURE — 99214 OFFICE O/P EST MOD 30 MIN: CPT | Performed by: FAMILY MEDICINE

## 2024-03-19 PROCEDURE — 3078F DIAST BP <80 MM HG: CPT | Performed by: FAMILY MEDICINE

## 2024-03-19 PROCEDURE — G8417 CALC BMI ABV UP PARAM F/U: HCPCS | Performed by: FAMILY MEDICINE

## 2024-03-19 PROCEDURE — 1036F TOBACCO NON-USER: CPT | Performed by: FAMILY MEDICINE

## 2024-03-19 PROCEDURE — 3074F SYST BP LT 130 MM HG: CPT | Performed by: FAMILY MEDICINE

## 2024-03-19 PROCEDURE — G8484 FLU IMMUNIZE NO ADMIN: HCPCS | Performed by: FAMILY MEDICINE

## 2024-03-19 ASSESSMENT — ENCOUNTER SYMPTOMS
COUGH: 0
WHEEZING: 0
NAUSEA: 0
VOMITING: 0
RHINORRHEA: 0
EYE REDNESS: 0
SORE THROAT: 0
BACK PAIN: 1
EYE DISCHARGE: 0
SHORTNESS OF BREATH: 0

## 2024-03-19 ASSESSMENT — PATIENT HEALTH QUESTIONNAIRE - PHQ9
1. LITTLE INTEREST OR PLEASURE IN DOING THINGS: NOT AT ALL
SUM OF ALL RESPONSES TO PHQ QUESTIONS 1-9: 0
SUM OF ALL RESPONSES TO PHQ9 QUESTIONS 1 & 2: 0
SUM OF ALL RESPONSES TO PHQ QUESTIONS 1-9: 0
2. FEELING DOWN, DEPRESSED OR HOPELESS: NOT AT ALL
SUM OF ALL RESPONSES TO PHQ QUESTIONS 1-9: 0
SUM OF ALL RESPONSES TO PHQ QUESTIONS 1-9: 0

## 2024-03-19 NOTE — PROGRESS NOTES
Topic Date Due    COVID-19 Vaccine (1) Never done    Hepatitis C screen  Never done    Respiratory Syncytial Virus (RSV) Pregnant or age 60 yrs+ (1 - 1-dose 60+ series) Never done    Depression Screen  03/28/2024    Annual Wellness Visit (Medicare)  03/28/2024    DTaP/Tdap/Td vaccine (3 - Td or Tdap) 01/11/2033    Flu vaccine  Completed    Shingles vaccine  Completed    Pneumococcal 65+ years Vaccine  Completed    Hepatitis A vaccine  Aged Out    Hepatitis B vaccine  Aged Out    Hib vaccine  Aged Out    Polio vaccine  Aged Out    Meningococcal (ACWY) vaccine  Aged Out       Subjective:      Review of Systems   Constitutional:  Negative for chills and fever.   HENT:  Negative for rhinorrhea and sore throat.    Eyes:  Negative for discharge and redness.   Respiratory:  Negative for cough, shortness of breath and wheezing.    Cardiovascular:  Negative for chest pain and palpitations.   Gastrointestinal:  Negative for abdominal pain, diarrhea, nausea and vomiting.   Genitourinary:  Negative for dysuria and frequency.   Musculoskeletal:  Positive for back pain. Negative for arthralgias and myalgias.   Neurological:  Negative for dizziness, light-headedness and headaches.   Psychiatric/Behavioral:  Negative for sleep disturbance.        Objective:     /74   Pulse 92   Ht 1.829 m (6')   Wt 84.9 kg (187 lb 3.2 oz)   SpO2 96%   BMI 25.39 kg/m²   Physical Exam  Vitals and nursing note reviewed.   Constitutional:       General: He is not in acute distress.     Appearance: He is well-developed. He is not ill-appearing.   HENT:      Head: Normocephalic and atraumatic.      Right Ear: External ear normal.      Left Ear: External ear normal.   Eyes:      General: No scleral icterus.        Right eye: No discharge.         Left eye: No discharge.      Conjunctiva/sclera: Conjunctivae normal.   Neck:      Thyroid: No thyromegaly.      Trachea: No tracheal deviation.   Cardiovascular:      Rate and Rhythm: Normal rate

## 2024-03-21 DIAGNOSIS — G89.29 CHRONIC LOW BACK PAIN WITHOUT SCIATICA, UNSPECIFIED BACK PAIN LATERALITY: ICD-10-CM

## 2024-03-21 DIAGNOSIS — M54.50 CHRONIC LOW BACK PAIN WITHOUT SCIATICA, UNSPECIFIED BACK PAIN LATERALITY: ICD-10-CM

## 2024-03-22 NOTE — RESULT ENCOUNTER NOTE
Advise patient shows severe multilevel degenerative disc disease.  No fractures hx lung cancer; pleural effusion

## 2024-04-04 DIAGNOSIS — M47.816 LUMBAR SPONDYLOSIS: ICD-10-CM

## 2024-04-04 RX ORDER — HYDROCODONE BITARTRATE AND ACETAMINOPHEN 7.5; 325 MG/1; MG/1
1 TABLET ORAL EVERY 8 HOURS PRN
Qty: 90 TABLET | Refills: 0 | Status: SHIPPED | OUTPATIENT
Start: 2024-04-04 | End: 2024-05-04

## 2024-04-08 NOTE — PROGRESS NOTES
YES Answers must have Comments  1. \"Have you been to the ER or urgent care clinic since your last visit? Hospitalized since your last visit?\"    No    2. “Have you seen or consulted any other health care providers since your last visit?”    No    For questions 3-5, Nurse/CMA to request records if not in chart  3. For patients aged 45-75: “Have you had a colorectal cancer screening such as a colonoscopy/FIT/Cologuard?    NA, based on age    Female patients only:  4. For female patients aged 40-74: “Have you had a mammogram?”   NA, based on gender    5. For female patients aged 21-65: “Have you had a pap smear?”    NA, based on gender

## 2024-04-09 ENCOUNTER — OFFICE VISIT (OUTPATIENT)
Dept: PRIMARY CARE CLINIC | Age: 79
End: 2024-04-09
Payer: MEDICARE

## 2024-04-09 VITALS
WEIGHT: 186.6 LBS | DIASTOLIC BLOOD PRESSURE: 74 MMHG | BODY MASS INDEX: 25.27 KG/M2 | SYSTOLIC BLOOD PRESSURE: 126 MMHG | HEART RATE: 95 BPM | OXYGEN SATURATION: 96 % | HEIGHT: 72 IN

## 2024-04-09 DIAGNOSIS — Z00.00 MEDICARE ANNUAL WELLNESS VISIT, SUBSEQUENT: Primary | ICD-10-CM

## 2024-04-09 PROCEDURE — 1123F ACP DISCUSS/DSCN MKR DOCD: CPT | Performed by: FAMILY MEDICINE

## 2024-04-09 PROCEDURE — 3078F DIAST BP <80 MM HG: CPT | Performed by: FAMILY MEDICINE

## 2024-04-09 PROCEDURE — G0439 PPPS, SUBSEQ VISIT: HCPCS | Performed by: FAMILY MEDICINE

## 2024-04-09 PROCEDURE — 3074F SYST BP LT 130 MM HG: CPT | Performed by: FAMILY MEDICINE

## 2024-04-09 SDOH — ECONOMIC STABILITY: FOOD INSECURITY: WITHIN THE PAST 12 MONTHS, THE FOOD YOU BOUGHT JUST DIDN'T LAST AND YOU DIDN'T HAVE MONEY TO GET MORE.: NEVER TRUE

## 2024-04-09 SDOH — ECONOMIC STABILITY: FOOD INSECURITY: WITHIN THE PAST 12 MONTHS, YOU WORRIED THAT YOUR FOOD WOULD RUN OUT BEFORE YOU GOT MONEY TO BUY MORE.: NEVER TRUE

## 2024-04-09 SDOH — ECONOMIC STABILITY: INCOME INSECURITY: HOW HARD IS IT FOR YOU TO PAY FOR THE VERY BASICS LIKE FOOD, HOUSING, MEDICAL CARE, AND HEATING?: NOT HARD AT ALL

## 2024-04-09 ASSESSMENT — PATIENT HEALTH QUESTIONNAIRE - PHQ9
SUM OF ALL RESPONSES TO PHQ QUESTIONS 1-9: 0
SUM OF ALL RESPONSES TO PHQ9 QUESTIONS 1 & 2: 0
SUM OF ALL RESPONSES TO PHQ QUESTIONS 1-9: 0
1. LITTLE INTEREST OR PLEASURE IN DOING THINGS: NOT AT ALL
SUM OF ALL RESPONSES TO PHQ QUESTIONS 1-9: 0
2. FEELING DOWN, DEPRESSED OR HOPELESS: NOT AT ALL
SUM OF ALL RESPONSES TO PHQ QUESTIONS 1-9: 0

## 2024-04-09 NOTE — PROGRESS NOTES
Medicare Annual Wellness Visit    Delfino Teran is here for Medicare AWV    Assessment & Plan     Recommendations for Preventive Services Due: see orders and patient instructions/AVS.  Recommended screening schedule for the next 5-10 years is provided to the patient in written form: see Patient Instructions/AVS.     Return in about 4 months (around 8/9/2024).     Subjective   The following acute and/or chronic problems were also addressed today:  Chronic back pain    Patient's complete Health Risk Assessment and screening values have been reviewed and are found in Flowsheets. The following problems were reviewed today and where indicated follow up appointments were made and/or referrals ordered.    Positive Risk Factor Screenings with Interventions:    Fall Risk:  Do you feel unsteady or are you worried about falling? : (!) yes  2 or more falls in past year?: no  Fall with injury in past year?: no     Interventions:    Reviewed medications, home hazards, visual acuity, and co-morbidities that can increase risk for falls         Controlled Medication Review:      Today's Pain Level: No data recorded   Opioid Risk: (Low risk score <55) Opioid risk score: 12    Patient is low risk for opioid use disorder or overdose.    Last PDMP Gulshan as Reviewed:  Review User Review Instant Review Result   MUNA HOUGH 4/9/2024  9:25 AM     Reviewed PDMP [1]     Last Controlled Substance Monitoring Documentation      Flowsheet Row Office Visit from 4/9/2024 in [facility]   Periodic Controlled Substance Monitoring Possible medication side effects, risk of tolerance/dependence & alternative treatments discussed., No signs of potential drug abuse or diversion identified., Obtaining appropriate analgesic effect of treatment. filed at 04/09/2024 5287   All MEDD Reviewed of previous treatment and response to treatment, patients adherence to medication and non-medication treatment, Benefits and risks of the

## 2024-04-09 NOTE — PATIENT INSTRUCTIONS
diabetes, high blood pressure, and high cholesterol. If you think you may have a problem with alcohol or drug use, talk to your doctor.   Medicines    Take your medicines exactly as prescribed. Call your doctor if you think you are having a problem with your medicine.     If your doctor recommends aspirin, take the amount directed each day. Make sure you take aspirin and not another kind of pain reliever, such as acetaminophen (Tylenol).   When should you call for help?   Call 911 if you have symptoms of a heart attack. These may include:    Chest pain or pressure, or a strange feeling in the chest.     Sweating.     Shortness of breath.     Pain, pressure, or a strange feeling in the back, neck, jaw, or upper belly or in one or both shoulders or arms.     Lightheadedness or sudden weakness.     A fast or irregular heartbeat.   After you call 911, the  may tell you to chew 1 adult-strength or 2 to 4 low-dose aspirin. Wait for an ambulance. Do not try to drive yourself.  Watch closely for changes in your health, and be sure to contact your doctor if you have any problems.  Where can you learn more?  Go to https://www.[employer].net/patientEd and enter F075 to learn more about \"A Healthy Heart: Care Instructions.\"  Current as of: June 24, 2023               Content Version: 14.0  © 6247-3270 [employer].   Care instructions adapted under license by [employer]. If you have questions about a medical condition or this instruction, always ask your healthcare professional. [employer] disclaims any warranty or liability for your use of this information.      Personalized Preventive Plan for Delfino Teran - 4/9/2024  Medicare offers a range of preventive health benefits. Some of the tests and screenings are paid in full while other may be subject to a deductible, co-insurance, and/or copay.    Some of these benefits include a comprehensive review of your medical history including

## 2024-05-02 DIAGNOSIS — M47.816 LUMBAR SPONDYLOSIS: ICD-10-CM

## 2024-05-02 RX ORDER — HYDROCODONE BITARTRATE AND ACETAMINOPHEN 7.5; 325 MG/1; MG/1
1 TABLET ORAL EVERY 8 HOURS PRN
Qty: 90 TABLET | Refills: 0 | Status: SHIPPED | OUTPATIENT
Start: 2024-05-02 | End: 2024-06-01

## 2024-05-28 DIAGNOSIS — M47.816 LUMBAR SPONDYLOSIS: ICD-10-CM

## 2024-05-28 RX ORDER — HYDROCODONE BITARTRATE AND ACETAMINOPHEN 7.5; 325 MG/1; MG/1
1 TABLET ORAL EVERY 8 HOURS PRN
Qty: 90 TABLET | Refills: 0 | Status: SHIPPED | OUTPATIENT
Start: 2024-05-28 | End: 2024-06-27

## 2024-06-26 DIAGNOSIS — M47.816 LUMBAR SPONDYLOSIS: ICD-10-CM

## 2024-06-26 NOTE — TELEPHONE ENCOUNTER
LAST VISIT:   4/9/2024     Future Appointments   Date Time Provider Department Center   8/13/2024  9:15 AM [facility]

## 2024-06-27 RX ORDER — HYDROCODONE BITARTRATE AND ACETAMINOPHEN 7.5; 325 MG/1; MG/1
1 TABLET ORAL EVERY 8 HOURS PRN
Qty: 90 TABLET | Refills: 0 | Status: SHIPPED | OUTPATIENT
Start: 2024-06-27 | End: 2024-07-27

## 2024-07-24 ENCOUNTER — TELEPHONE (OUTPATIENT)
Dept: PRIMARY CARE CLINIC | Age: 79
End: 2024-07-24

## 2024-07-24 NOTE — TELEPHONE ENCOUNTER
Patient has poison ivy on arms and legs, started a couple weeks ago. Patient is using a poison ivy scrub but that isn't helping. Patient is requesting a shot. If sending medication send to [facility].     Leave message with wife or voicemail.    Please advise.

## 2024-07-25 ENCOUNTER — NURSE ONLY (OUTPATIENT)
Dept: PRIMARY CARE CLINIC | Age: 79
End: 2024-07-25
Payer: MEDICARE

## 2024-07-25 VITALS — SYSTOLIC BLOOD PRESSURE: 120 MMHG | DIASTOLIC BLOOD PRESSURE: 80 MMHG | HEART RATE: 83 BPM | OXYGEN SATURATION: 95 %

## 2024-07-25 DIAGNOSIS — L23.7 POISON IVY: Primary | ICD-10-CM

## 2024-07-25 DIAGNOSIS — M47.816 LUMBAR SPONDYLOSIS: ICD-10-CM

## 2024-07-25 PROCEDURE — 96372 THER/PROPH/DIAG INJ SC/IM: CPT | Performed by: FAMILY MEDICINE

## 2024-07-25 RX ORDER — HYDROCODONE BITARTRATE AND ACETAMINOPHEN 7.5; 325 MG/1; MG/1
1 TABLET ORAL EVERY 8 HOURS PRN
Qty: 90 TABLET | Refills: 0 | Status: SHIPPED | OUTPATIENT
Start: 2024-07-25 | End: 2024-08-24

## 2024-07-25 RX ORDER — TRIAMCINOLONE ACETONIDE 40 MG/ML
40 INJECTION, SUSPENSION INTRA-ARTICULAR; INTRAMUSCULAR ONCE
Status: COMPLETED | OUTPATIENT
Start: 2024-07-25 | End: 2024-07-25

## 2024-07-25 RX ADMIN — TRIAMCINOLONE ACETONIDE 40 MG: 40 INJECTION, SUSPENSION INTRA-ARTICULAR; INTRAMUSCULAR at 11:02

## 2024-07-25 NOTE — TELEPHONE ENCOUNTER
LAST VISIT:   4/9/2024     Future Appointments   Date Time Provider Department Center   7/25/2024 10:30 AM [facility]   8/13/2024  9:15 AM Fernando Daniel MD [facility]

## 2024-08-13 ENCOUNTER — OFFICE VISIT (OUTPATIENT)
Dept: PRIMARY CARE CLINIC | Age: 79
End: 2024-08-13
Payer: MEDICARE

## 2024-08-13 VITALS
DIASTOLIC BLOOD PRESSURE: 78 MMHG | OXYGEN SATURATION: 98 % | HEIGHT: 72 IN | BODY MASS INDEX: 24.24 KG/M2 | HEART RATE: 78 BPM | WEIGHT: 179 LBS | SYSTOLIC BLOOD PRESSURE: 124 MMHG

## 2024-08-13 DIAGNOSIS — M54.50 CHRONIC LOW BACK PAIN WITHOUT SCIATICA, UNSPECIFIED BACK PAIN LATERALITY: ICD-10-CM

## 2024-08-13 DIAGNOSIS — G89.29 CHRONIC LOW BACK PAIN WITHOUT SCIATICA, UNSPECIFIED BACK PAIN LATERALITY: ICD-10-CM

## 2024-08-13 DIAGNOSIS — Z12.5 SCREENING PSA (PROSTATE SPECIFIC ANTIGEN): ICD-10-CM

## 2024-08-13 DIAGNOSIS — Z00.00 ANNUAL PHYSICAL EXAM: ICD-10-CM

## 2024-08-13 DIAGNOSIS — Z79.899 HIGH RISK MEDICATION USE: ICD-10-CM

## 2024-08-13 DIAGNOSIS — M10.00 IDIOPATHIC GOUT, UNSPECIFIED CHRONICITY, UNSPECIFIED SITE: ICD-10-CM

## 2024-08-13 DIAGNOSIS — Z13.6 ENCOUNTER FOR LIPID SCREENING FOR CARDIOVASCULAR DISEASE: Primary | ICD-10-CM

## 2024-08-13 DIAGNOSIS — Z13.220 ENCOUNTER FOR LIPID SCREENING FOR CARDIOVASCULAR DISEASE: Primary | ICD-10-CM

## 2024-08-13 PROBLEM — I47.10 PSVT (PAROXYSMAL SUPRAVENTRICULAR TACHYCARDIA) (HCC): Status: RESOLVED | Noted: 2021-05-21 | Resolved: 2024-08-13

## 2024-08-13 PROCEDURE — 1123F ACP DISCUSS/DSCN MKR DOCD: CPT | Performed by: FAMILY MEDICINE

## 2024-08-13 PROCEDURE — G8427 DOCREV CUR MEDS BY ELIG CLIN: HCPCS | Performed by: FAMILY MEDICINE

## 2024-08-13 PROCEDURE — 3074F SYST BP LT 130 MM HG: CPT | Performed by: FAMILY MEDICINE

## 2024-08-13 PROCEDURE — 99213 OFFICE O/P EST LOW 20 MIN: CPT | Performed by: FAMILY MEDICINE

## 2024-08-13 PROCEDURE — G8420 CALC BMI NORM PARAMETERS: HCPCS | Performed by: FAMILY MEDICINE

## 2024-08-13 PROCEDURE — 3078F DIAST BP <80 MM HG: CPT | Performed by: FAMILY MEDICINE

## 2024-08-13 PROCEDURE — 1036F TOBACCO NON-USER: CPT | Performed by: FAMILY MEDICINE

## 2024-08-13 RX ORDER — SILDENAFIL 100 MG/1
100 TABLET, FILM COATED ORAL PRN
Qty: 30 TABLET | Refills: 1 | Status: SHIPPED | OUTPATIENT
Start: 2024-08-13

## 2024-08-13 RX ORDER — ALLOPURINOL 300 MG/1
300 TABLET ORAL DAILY
Qty: 90 TABLET | Refills: 3 | Status: SHIPPED | OUTPATIENT
Start: 2024-08-13

## 2024-08-13 ASSESSMENT — ENCOUNTER SYMPTOMS
SORE THROAT: 0
EYE REDNESS: 0
WHEEZING: 0
BACK PAIN: 1
ABDOMINAL PAIN: 0
SHORTNESS OF BREATH: 0
RHINORRHEA: 0
VOMITING: 0
EYE DISCHARGE: 0
NAUSEA: 0
DIARRHEA: 0
COUGH: 0

## 2024-08-13 NOTE — PROGRESS NOTES
[facility]  [facility]  [address]  [place] [address]  Dept: [phone]    Delfino Teran is a 79 y.o. male Established patient, who presents today for his medical conditions/complaints as noted below.      Chief Complaint   Patient presents with    Hypertension     Htn check up        HPI:     HPI  Pt here for blood pressure recheck.  No chest pain or sob. No fever or chills.   Patient denies any nausea or vomiting.  No lightheadedness or dizziness.  Not using any street drugs.  Patient has been using the Norco for his lower back pain as well as his arthritis pain in his hands.  States has been effective.  Again discussed option of referral to pain management but patient declines.    OARRS report reviewed  Current MME 24  Risk of addiction and tolerance were discussed.  Possible side effects including hyperalgesia and constipation were discussed.    Urine drug screen done today    Patient wondering if he still needs to take the Lopressor 12.5 mg daily.    Reviewed prior notes None  Reviewed previous Labs    No components found for: \"LDLCHOLESTEROL\", \"LDLCALC\"    (goal LDL is <100)   AST (U/L)   Date Value   06/02/2021 45 (H)     ALT (U/L)   Date Value   06/02/2021 49     BUN (mg/dL)   Date Value   04/27/2022 17     Hemoglobin A1C (%)   Date Value   04/13/2022 5.3     BP Readings from Last 3 Encounters:   08/13/24 124/78   07/25/24 120/80   04/09/24 126/74          (goal 120/80)    Past Medical History:   Diagnosis Date    COVID 12/22/2021    History of PSVT (paroxysmal supraventricular tachycardia) 5/6/2016    Low back pain     UTI (urinary tract infection)     Uvulitis     Vasodepressor syncope 5/6/2016    Wound, open, face       Past Surgical History:   Procedure Laterality Date    COLONOSCOPY      HERNIA REPAIR Bilateral     BILATERAL INGUINAL HERNIA REPAIR LAPAROSCOPIC ROBOTIC WITH possible MESH - Bilateral    HERNIA REPAIR Bilateral 5/6/2022    LAPAROSCOPIC

## 2024-09-03 DIAGNOSIS — M16.11 PRIMARY OSTEOARTHRITIS OF RIGHT HIP: ICD-10-CM

## 2024-09-03 DIAGNOSIS — M47.816 LUMBAR SPONDYLOSIS: ICD-10-CM

## 2024-09-03 RX ORDER — HYDROCODONE BITARTRATE AND ACETAMINOPHEN 7.5; 325 MG/1; MG/1
1 TABLET ORAL EVERY 8 HOURS PRN
Qty: 90 TABLET | Refills: 0 | Status: SHIPPED | OUTPATIENT
Start: 2024-09-03 | End: 2024-10-03

## 2024-09-08 LAB
6-ACETYLMORPHINE, UR: NORMAL
AMPHETAMINE SCREEN URINE: NORMAL
BARBITURATE SCREEN URINE: NORMAL
BENZODIAZEPINE SCREEN, URINE: NORMAL
CANNABINOID SCREEN URINE: NORMAL
COCAINE METABOLITE, URINE: NORMAL
CREATININE URINE: NORMAL
EDDP, URINE: NORMAL
ETHANOL URINE: NORMAL
MDMA, URINE: NORMAL
METHADONE SCREEN, URINE: NORMAL
METHAMPHETAMINE, URINE: NORMAL
OPIATES, URINE: NORMAL
OXYCODONE: NORMAL
PCP,URINE: NORMAL
PCP: NORMAL
PH, URINE: NORMAL
PROPOXYPHENE, URINE: NORMAL
TRICYCLIC ANTIDEPRESSANTS, UR: NORMAL

## 2024-09-10 DIAGNOSIS — Z79.899 HIGH RISK MEDICATION USE: ICD-10-CM

## 2024-09-10 DIAGNOSIS — M54.50 CHRONIC LOW BACK PAIN WITHOUT SCIATICA, UNSPECIFIED BACK PAIN LATERALITY: ICD-10-CM

## 2024-09-10 DIAGNOSIS — G89.29 CHRONIC LOW BACK PAIN WITHOUT SCIATICA, UNSPECIFIED BACK PAIN LATERALITY: ICD-10-CM

## 2024-09-30 ENCOUNTER — TELEPHONE (OUTPATIENT)
Dept: PRIMARY CARE CLINIC | Age: 79
End: 2024-09-30

## 2024-09-30 DIAGNOSIS — M47.816 LUMBAR SPONDYLOSIS: ICD-10-CM

## 2024-09-30 RX ORDER — HYDROCODONE BITARTRATE AND ACETAMINOPHEN 7.5; 325 MG/1; MG/1
1 TABLET ORAL EVERY 8 HOURS PRN
Qty: 90 TABLET | Refills: 0 | Status: SHIPPED | OUTPATIENT
Start: 2024-09-30 | End: 2024-10-30

## 2024-09-30 NOTE — TELEPHONE ENCOUNTER
FYI: patient called to let the office know that during the week of 09/09/24 he received his Flu/Pneumo vaccines at [facility].

## 2024-09-30 NOTE — TELEPHONE ENCOUNTER
LAST VISIT:   8/13/2024     No future appointments.    Pharmacy verified? Yes    [facility] [address] [place] [address] - [address] -  [phone] - F [phone]  [address] [place] [id number]  Phone: [phone] Fax: [phone]

## 2024-11-01 DIAGNOSIS — M47.816 LUMBAR SPONDYLOSIS: ICD-10-CM

## 2024-11-01 RX ORDER — HYDROCODONE BITARTRATE AND ACETAMINOPHEN 7.5; 325 MG/1; MG/1
1 TABLET ORAL EVERY 8 HOURS PRN
Qty: 90 TABLET | Refills: 0 | Status: SHIPPED | OUTPATIENT
Start: 2024-11-01 | End: 2024-12-01

## 2024-12-10 ENCOUNTER — OFFICE VISIT (OUTPATIENT)
Dept: PRIMARY CARE CLINIC | Age: 79
End: 2024-12-10

## 2024-12-10 VITALS
SYSTOLIC BLOOD PRESSURE: 126 MMHG | HEIGHT: 72 IN | WEIGHT: 182 LBS | HEART RATE: 74 BPM | DIASTOLIC BLOOD PRESSURE: 80 MMHG | BODY MASS INDEX: 24.65 KG/M2 | OXYGEN SATURATION: 97 %

## 2024-12-10 DIAGNOSIS — Z00.00 ANNUAL PHYSICAL EXAM: ICD-10-CM

## 2024-12-10 DIAGNOSIS — M47.816 LUMBAR SPONDYLOSIS: ICD-10-CM

## 2024-12-10 DIAGNOSIS — Z13.6 ENCOUNTER FOR LIPID SCREENING FOR CARDIOVASCULAR DISEASE: Primary | ICD-10-CM

## 2024-12-10 DIAGNOSIS — Z12.5 SCREENING PSA (PROSTATE SPECIFIC ANTIGEN): ICD-10-CM

## 2024-12-10 DIAGNOSIS — Z23 NEED FOR VIRAL IMMUNIZATION: ICD-10-CM

## 2024-12-10 DIAGNOSIS — Z13.220 ENCOUNTER FOR LIPID SCREENING FOR CARDIOVASCULAR DISEASE: Primary | ICD-10-CM

## 2024-12-10 RX ORDER — HYDROCODONE BITARTRATE AND ACETAMINOPHEN 7.5; 325 MG/1; MG/1
1 TABLET ORAL EVERY 8 HOURS PRN
Qty: 90 TABLET | Refills: 0 | Status: SHIPPED | OUTPATIENT
Start: 2024-12-10 | End: 2025-01-09

## 2024-12-10 ASSESSMENT — ENCOUNTER SYMPTOMS
SORE THROAT: 0
EYE DISCHARGE: 0
DIARRHEA: 0
ABDOMINAL PAIN: 0
EYE REDNESS: 0
NAUSEA: 0
COUGH: 0
BACK PAIN: 1
VOMITING: 0
SHORTNESS OF BREATH: 0
WHEEZING: 0
RHINORRHEA: 0

## 2024-12-10 NOTE — PROGRESS NOTES
[facility]  [facility]  [address]  [place] [address]  Dept: [phone]    Delfino Teran is a 79 y.o. male Established patient, who presents today for his medical conditions/complaints as noted below.      Chief Complaint   Patient presents with    Back Pain     Medication check        HPI:     HPI  Patient here for follow-up of back pain.  Using his Norco up to 3 times a day.  Patient states medication has been effective.  Denies any side effects.  Denies any street drugs.    OARRS report reviewed  Current MME 18  Risk of addiction and tolerance discussed.  Possible side effects including hyperalgesia and constipation discussed.    Patient denies any fevers or chills.  No lightheadedness or dizziness.  No other changes in medications.    Reviewed prior notes None  Reviewed previous Labs    No components found for: \"LDLCHOLESTEROL\", \"LDLCALC\"    (goal LDL is <100)   AST (U/L)   Date Value   06/02/2021 45 (H)     ALT (U/L)   Date Value   06/02/2021 49     BUN (mg/dL)   Date Value   04/27/2022 17     Hemoglobin A1C (%)   Date Value   04/13/2022 5.3     BP Readings from Last 3 Encounters:   12/10/24 126/80   08/13/24 124/78   07/25/24 120/80          (goal 120/80)    Past Medical History:   Diagnosis Date    COVID 12/22/2021    History of PSVT (paroxysmal supraventricular tachycardia) 5/6/2016    Low back pain     UTI (urinary tract infection)     Uvulitis     Vasodepressor syncope 5/6/2016    Wound, open, face       Past Surgical History:   Procedure Laterality Date    COLONOSCOPY      HERNIA REPAIR Bilateral     BILATERAL INGUINAL HERNIA REPAIR LAPAROSCOPIC ROBOTIC WITH possible MESH - Bilateral    HERNIA REPAIR Bilateral 5/6/2022    LAPAROSCOPIC ROBOTIC BILATERAL INGUINAL HERNIA REPAIR WITH  MESH performed by Forest Singh DO at [facility] OR    HIP ARTHROPLASTY Right 01/07/2019       Family History   Problem Relation Age of Onset    Cancer Mother

## 2025-01-10 DIAGNOSIS — M47.816 LUMBAR SPONDYLOSIS: ICD-10-CM

## 2025-01-10 RX ORDER — HYDROCODONE BITARTRATE AND ACETAMINOPHEN 7.5; 325 MG/1; MG/1
1 TABLET ORAL EVERY 8 HOURS PRN
Qty: 90 TABLET | Refills: 0 | Status: SHIPPED | OUTPATIENT
Start: 2025-01-10 | End: 2025-02-09

## 2025-02-06 DIAGNOSIS — M47.816 LUMBAR SPONDYLOSIS: ICD-10-CM

## 2025-02-07 RX ORDER — HYDROCODONE BITARTRATE AND ACETAMINOPHEN 7.5; 325 MG/1; MG/1
1 TABLET ORAL EVERY 8 HOURS PRN
Qty: 90 TABLET | Refills: 0 | Status: SHIPPED | OUTPATIENT
Start: 2025-02-07 | End: 2025-03-09

## 2025-03-10 DIAGNOSIS — M16.11 PRIMARY OSTEOARTHRITIS OF RIGHT HIP: ICD-10-CM

## 2025-03-10 RX ORDER — HYDROCODONE BITARTRATE AND ACETAMINOPHEN 10; 325 MG/1; MG/1
1 TABLET ORAL EVERY 8 HOURS PRN
Qty: 90 TABLET | Refills: 0 | Status: SHIPPED | OUTPATIENT
Start: 2025-03-10 | End: 2025-04-09

## 2025-04-03 DIAGNOSIS — M16.11 PRIMARY OSTEOARTHRITIS OF RIGHT HIP: ICD-10-CM

## 2025-04-03 NOTE — TELEPHONE ENCOUNTER
Looks like this was sent as 10 last month, when pt was on 7.5 up until then, but no documentation as to why.  Please verify the dose

## 2025-04-04 DIAGNOSIS — M47.816 LUMBAR SPONDYLOSIS: ICD-10-CM

## 2025-04-04 RX ORDER — HYDROCODONE BITARTRATE AND ACETAMINOPHEN 10; 325 MG/1; MG/1
1 TABLET ORAL EVERY 8 HOURS PRN
Qty: 90 TABLET | Refills: 0 | OUTPATIENT
Start: 2025-04-04 | End: 2025-05-04

## 2025-04-04 RX ORDER — HYDROCODONE BITARTRATE AND ACETAMINOPHEN 7.5; 325 MG/1; MG/1
1 TABLET ORAL EVERY 8 HOURS PRN
Qty: 90 TABLET | Refills: 0 | Status: SHIPPED | OUTPATIENT
Start: 2025-04-04 | End: 2025-05-04

## 2025-04-04 NOTE — TELEPHONE ENCOUNTER
Advise patient Norco 7.5 mg sent to pharmacy.  Need to try to keep narcotics at the lowest possible dose.

## 2025-04-07 NOTE — TELEPHONE ENCOUNTER
Patient advised Norco 7.5 mg sent in and to take as directed.  He states he will be taking more than prescribed, advised patient to take as directed.  Appointment scheduled with Dr. Daniel to discuss.

## 2025-04-29 ENCOUNTER — OFFICE VISIT (OUTPATIENT)
Dept: PRIMARY CARE CLINIC | Age: 80
End: 2025-04-29
Payer: MEDICARE

## 2025-04-29 VITALS
HEART RATE: 92 BPM | SYSTOLIC BLOOD PRESSURE: 126 MMHG | HEIGHT: 72 IN | BODY MASS INDEX: 22.89 KG/M2 | WEIGHT: 169 LBS | OXYGEN SATURATION: 95 % | DIASTOLIC BLOOD PRESSURE: 78 MMHG

## 2025-04-29 DIAGNOSIS — M47.816 LUMBAR SPONDYLOSIS: ICD-10-CM

## 2025-04-29 DIAGNOSIS — M10.00 IDIOPATHIC GOUT, UNSPECIFIED CHRONICITY, UNSPECIFIED SITE: ICD-10-CM

## 2025-04-29 DIAGNOSIS — J40 BRONCHITIS: ICD-10-CM

## 2025-04-29 DIAGNOSIS — I47.9 PAROXYSMAL TACHYCARDIA (HCC): ICD-10-CM

## 2025-04-29 DIAGNOSIS — Z13.6 ENCOUNTER FOR LIPID SCREENING FOR CARDIOVASCULAR DISEASE: ICD-10-CM

## 2025-04-29 DIAGNOSIS — Z13.220 ENCOUNTER FOR LIPID SCREENING FOR CARDIOVASCULAR DISEASE: ICD-10-CM

## 2025-04-29 DIAGNOSIS — F03.A0 MILD DEMENTIA WITHOUT BEHAVIORAL DISTURBANCE, PSYCHOTIC DISTURBANCE, MOOD DISTURBANCE, OR ANXIETY, UNSPECIFIED DEMENTIA TYPE (HCC): ICD-10-CM

## 2025-04-29 DIAGNOSIS — Z00.00 MEDICARE ANNUAL WELLNESS VISIT, SUBSEQUENT: Primary | ICD-10-CM

## 2025-04-29 DIAGNOSIS — Z00.00 ANNUAL PHYSICAL EXAM: ICD-10-CM

## 2025-04-29 DIAGNOSIS — I10 ESSENTIAL (PRIMARY) HYPERTENSION: ICD-10-CM

## 2025-04-29 DIAGNOSIS — Z12.5 SCREENING PSA (PROSTATE SPECIFIC ANTIGEN): ICD-10-CM

## 2025-04-29 PROCEDURE — 1159F MED LIST DOCD IN RCRD: CPT | Performed by: FAMILY MEDICINE

## 2025-04-29 PROCEDURE — 3074F SYST BP LT 130 MM HG: CPT | Performed by: FAMILY MEDICINE

## 2025-04-29 PROCEDURE — 3078F DIAST BP <80 MM HG: CPT | Performed by: FAMILY MEDICINE

## 2025-04-29 PROCEDURE — 1123F ACP DISCUSS/DSCN MKR DOCD: CPT | Performed by: FAMILY MEDICINE

## 2025-04-29 PROCEDURE — G0439 PPPS, SUBSEQ VISIT: HCPCS | Performed by: FAMILY MEDICINE

## 2025-04-29 RX ORDER — HYDROCODONE BITARTRATE AND ACETAMINOPHEN 7.5; 325 MG/1; MG/1
1 TABLET ORAL EVERY 8 HOURS PRN
Qty: 90 TABLET | Refills: 0 | Status: SHIPPED | OUTPATIENT
Start: 2025-04-29 | End: 2025-05-29

## 2025-04-29 RX ORDER — DOXYCYCLINE HYCLATE 100 MG
100 TABLET ORAL 2 TIMES DAILY
Qty: 20 TABLET | Refills: 0 | Status: SHIPPED | OUTPATIENT
Start: 2025-04-29 | End: 2025-05-09

## 2025-04-29 SDOH — ECONOMIC STABILITY: FOOD INSECURITY: WITHIN THE PAST 12 MONTHS, YOU WORRIED THAT YOUR FOOD WOULD RUN OUT BEFORE YOU GOT MONEY TO BUY MORE.: NEVER TRUE

## 2025-04-29 SDOH — ECONOMIC STABILITY: FOOD INSECURITY: WITHIN THE PAST 12 MONTHS, THE FOOD YOU BOUGHT JUST DIDN'T LAST AND YOU DIDN'T HAVE MONEY TO GET MORE.: NEVER TRUE

## 2025-04-29 ASSESSMENT — PATIENT HEALTH QUESTIONNAIRE - PHQ9
2. FEELING DOWN, DEPRESSED OR HOPELESS: NOT AT ALL
1. LITTLE INTEREST OR PLEASURE IN DOING THINGS: NOT AT ALL
SUM OF ALL RESPONSES TO PHQ QUESTIONS 1-9: 0
SUM OF ALL RESPONSES TO PHQ QUESTIONS 1-9: 0

## 2025-04-29 ASSESSMENT — LIFESTYLE VARIABLES
HOW OFTEN DO YOU HAVE A DRINK CONTAINING ALCOHOL: MONTHLY OR LESS
HOW MANY STANDARD DRINKS CONTAINING ALCOHOL DO YOU HAVE ON A TYPICAL DAY: 1 OR 2

## 2025-04-29 NOTE — PATIENT INSTRUCTIONS

## 2025-04-29 NOTE — PROGRESS NOTES
Medicare Annual Wellness Visit    Delfino Teran is here for Medicare AWV (Medicare wellness visit )    Assessment & Plan  1. Medicare wellness visit.  - His current morphine milligram equivalent (MME) for pain medication is within the acceptable range as per the state medical board's guidelines, specifically at 27.  - A new lab order will be issued.    2. Cough.  - Reports a persistent cough lasting for about one to two months, which initially presented with some phlegm but has not fully resolved.  - Doxycycline will be prescribed to treat the cough as a potential bronchitis infection.  - If the antibiotic resolves the issue, no further action is needed. If the cough persists, he should notify the office for further evaluation, including blood work and a chest x-ray.    3. Gout.  - Currently taking allopurinol and reports that his gout is well-managed as long as he avoids triggers like pork.  - No new symptoms or flare-ups reported.    4. Back pain.  - Uses Norco 7.5 mg for back pain, although it is less effective than the previous 10 mg dose.  - Sometimes forgets to take his doses but manages the pain by adjusting the frequency of intake.  - Prescription drug monitoring program (PDMP) indicates compliance with state guidelines for MME.  Medicare annual wellness visit, subsequent  Mild dementia without behavioral disturbance, psychotic disturbance, mood disturbance, or anxiety, unspecified dementia type (HCC)  Essential (primary) hypertension  Paroxysmal tachycardia (HCC)  Idiopathic gout, unspecified chronicity, unspecified site  -     Uric Acid; Future  Encounter for lipid screening for cardiovascular disease  -     Lipid, Fasting; Future  Annual physical exam  -     Basic Metabolic Panel, Fasting; Future  -     Hepatic Function Panel; Future  Screening PSA (prostate specific antigen)  -     PSA Screening; Future  Bronchitis  -     doxycycline hyclate (VIBRA-TABS) 100 MG tablet; Take 1 tablet by mouth 2 times

## 2025-06-09 DIAGNOSIS — M10.00 IDIOPATHIC GOUT, UNSPECIFIED CHRONICITY, UNSPECIFIED SITE: ICD-10-CM

## 2025-06-09 DIAGNOSIS — M47.816 LUMBAR SPONDYLOSIS: ICD-10-CM

## 2025-06-09 RX ORDER — HYDROCODONE BITARTRATE AND ACETAMINOPHEN 7.5; 325 MG/1; MG/1
1 TABLET ORAL EVERY 8 HOURS PRN
Qty: 90 TABLET | Refills: 0 | Status: SHIPPED | OUTPATIENT
Start: 2025-06-09 | End: 2025-07-09

## 2025-06-09 RX ORDER — ALLOPURINOL 300 MG/1
300 TABLET ORAL DAILY
Qty: 90 TABLET | Refills: 3 | Status: SHIPPED | OUTPATIENT
Start: 2025-06-09

## 2025-07-07 DIAGNOSIS — M47.816 LUMBAR SPONDYLOSIS: ICD-10-CM

## 2025-07-08 RX ORDER — HYDROCODONE BITARTRATE AND ACETAMINOPHEN 7.5; 325 MG/1; MG/1
1 TABLET ORAL EVERY 8 HOURS PRN
Qty: 90 TABLET | Refills: 0 | Status: SHIPPED | OUTPATIENT
Start: 2025-07-08 | End: 2025-08-07

## 2025-08-18 DIAGNOSIS — M47.816 LUMBAR SPONDYLOSIS: ICD-10-CM

## 2025-08-18 RX ORDER — HYDROCODONE BITARTRATE AND ACETAMINOPHEN 7.5; 325 MG/1; MG/1
1 TABLET ORAL EVERY 8 HOURS PRN
Qty: 90 TABLET | Refills: 0 | Status: SHIPPED | OUTPATIENT
Start: 2025-08-18 | End: 2025-09-17

## 2025-08-26 ENCOUNTER — OFFICE VISIT (OUTPATIENT)
Dept: PRIMARY CARE CLINIC | Age: 80
End: 2025-08-26
Payer: MEDICARE

## 2025-08-26 VITALS
SYSTOLIC BLOOD PRESSURE: 126 MMHG | HEART RATE: 84 BPM | OXYGEN SATURATION: 97 % | HEIGHT: 72 IN | BODY MASS INDEX: 22.89 KG/M2 | WEIGHT: 169 LBS | DIASTOLIC BLOOD PRESSURE: 76 MMHG

## 2025-08-26 DIAGNOSIS — Z12.5 SCREENING PSA (PROSTATE SPECIFIC ANTIGEN): ICD-10-CM

## 2025-08-26 DIAGNOSIS — M10.00 IDIOPATHIC GOUT, UNSPECIFIED CHRONICITY, UNSPECIFIED SITE: ICD-10-CM

## 2025-08-26 DIAGNOSIS — R79.9 ABNORMAL BLOOD CHEMISTRY: ICD-10-CM

## 2025-08-26 DIAGNOSIS — Z79.899 HIGH RISK MEDICATION USE: ICD-10-CM

## 2025-08-26 DIAGNOSIS — Z00.00 ANNUAL PHYSICAL EXAM: ICD-10-CM

## 2025-08-26 DIAGNOSIS — M47.816 LUMBAR SPONDYLOSIS: Primary | ICD-10-CM

## 2025-08-26 DIAGNOSIS — Z13.220 ENCOUNTER FOR LIPID SCREENING FOR CARDIOVASCULAR DISEASE: ICD-10-CM

## 2025-08-26 DIAGNOSIS — Z13.6 ENCOUNTER FOR LIPID SCREENING FOR CARDIOVASCULAR DISEASE: ICD-10-CM

## 2025-08-26 LAB
ALCOHOL URINE: NORMAL
AMPHETAMINE SCREEN URINE: NORMAL
BARBITURATE SCREEN URINE: NORMAL
BENZODIAZEPINE SCREEN, URINE: NORMAL
BUPRENORPHINE URINE: NORMAL
COCAINE METABOLITE SCREEN URINE: NORMAL
FENTANYL SCREEN, URINE: NORMAL
GABAPENTIN SCREEN, URINE: NORMAL
MDMA, URINE: NORMAL
METHADONE SCREEN, URINE: NORMAL
METHAMPHETAMINE, URINE: NORMAL
OPIATE SCREEN URINE: NORMAL
OXYCODONE SCREEN URINE: NORMAL
PHENCYCLIDINE SCREEN URINE: NORMAL
PROPOXYPHENE SCREEN, URINE: NORMAL
SYNTHETIC CANNABINOIDS(K2) SCREEN, URINE: NORMAL
THC SCREEN, URINE: NORMAL
TRAMADOL SCREEN URINE: NORMAL
TRICYCLIC ANTIDEPRESSANTS, UR: NORMAL

## 2025-08-26 PROCEDURE — 3078F DIAST BP <80 MM HG: CPT | Performed by: FAMILY MEDICINE

## 2025-08-26 PROCEDURE — 1036F TOBACCO NON-USER: CPT | Performed by: FAMILY MEDICINE

## 2025-08-26 PROCEDURE — G8427 DOCREV CUR MEDS BY ELIG CLIN: HCPCS | Performed by: FAMILY MEDICINE

## 2025-08-26 PROCEDURE — 99214 OFFICE O/P EST MOD 30 MIN: CPT | Performed by: FAMILY MEDICINE

## 2025-08-26 PROCEDURE — 80305 DRUG TEST PRSMV DIR OPT OBS: CPT | Performed by: FAMILY MEDICINE

## 2025-08-26 PROCEDURE — G8420 CALC BMI NORM PARAMETERS: HCPCS | Performed by: FAMILY MEDICINE

## 2025-08-26 PROCEDURE — 3074F SYST BP LT 130 MM HG: CPT | Performed by: FAMILY MEDICINE

## 2025-08-26 PROCEDURE — 1159F MED LIST DOCD IN RCRD: CPT | Performed by: FAMILY MEDICINE

## 2025-08-26 PROCEDURE — 1123F ACP DISCUSS/DSCN MKR DOCD: CPT | Performed by: FAMILY MEDICINE

## 2025-08-26 ASSESSMENT — ENCOUNTER SYMPTOMS
ABDOMINAL PAIN: 0
COUGH: 0
NAUSEA: 0
EYE DISCHARGE: 0
BACK PAIN: 1
RHINORRHEA: 0
WHEEZING: 0
VOMITING: 0
SORE THROAT: 0
EYE REDNESS: 0
SHORTNESS OF BREATH: 0
DIARRHEA: 0

## 2025-08-26 ASSESSMENT — PATIENT HEALTH QUESTIONNAIRE - PHQ9
SUM OF ALL RESPONSES TO PHQ QUESTIONS 1-9: 0
SUM OF ALL RESPONSES TO PHQ QUESTIONS 1-9: 0
1. LITTLE INTEREST OR PLEASURE IN DOING THINGS: NOT AT ALL
SUM OF ALL RESPONSES TO PHQ QUESTIONS 1-9: 0
SUM OF ALL RESPONSES TO PHQ QUESTIONS 1-9: 0
2. FEELING DOWN, DEPRESSED OR HOPELESS: NOT AT ALL

## (undated) DEVICE — SOLUTION IV IRRIG POUR BRL 0.9% SODIUM CHL 2F7124

## (undated) DEVICE — APPLICATOR MEDICATED 26 CC SOLUTION HI LT ORNG CHLORAPREP

## (undated) DEVICE — 40580 - THE PINK PAD - ADVANCED TRENDELENBURG POSITIONING KIT: Brand: 40580 - THE PINK PAD - ADVANCED TRENDELENBURG POSITIONING KIT

## (undated) DEVICE — TIP COVER ACCESSORY

## (undated) DEVICE — SOLUTION ANTIFOG VIS SYS CLEARIFY LAPSCP

## (undated) DEVICE — PLUMEPORT LAPAROSCOPIC SMOKE FILTRATION DEVICE: Brand: PLUMEPORT ACTIV

## (undated) DEVICE — BLANKET WRM W29.9XL79.1IN UP BODY FORC AIR MISTRAL-AIR

## (undated) DEVICE — SUTURE V-LOC 180 SZ 3-0 L6IN ABSRB GRN V-20 L26MM 1/2 CIR VLOCL0604

## (undated) DEVICE — INSUFFLATION NEEDLE TO ESTABLISH PNEUMOPERITONEUM.: Brand: INSUFFLATION NEEDLE

## (undated) DEVICE — ARM DRAPE

## (undated) DEVICE — STRAP,POSITIONING,KNEE/BODY,FOAM,4X60": Brand: MEDLINE

## (undated) DEVICE — PROTECTOR ULN NRV PUR FOAM HK LOOP STRP ANATOMICALLY

## (undated) DEVICE — BLADELESS OBTURATOR: Brand: WECK VISTA

## (undated) DEVICE — GLOVE ORANGE PI 7   MSG9070

## (undated) DEVICE — CANNULA SEAL

## (undated) DEVICE — ADHESIVE SKIN CLSR 0.7ML TOP DERMBND ADV

## (undated) DEVICE — MHPB BASIC LAP PACK: Brand: MEDLINE INDUSTRIES, INC.

## (undated) DEVICE — SUTURE MCRYL + SZ 4-0 L27IN ABSRB UD L19MM PS-2 3/8 CIR MCP426H